# Patient Record
Sex: MALE | Race: WHITE | NOT HISPANIC OR LATINO | Employment: OTHER | URBAN - METROPOLITAN AREA
[De-identification: names, ages, dates, MRNs, and addresses within clinical notes are randomized per-mention and may not be internally consistent; named-entity substitution may affect disease eponyms.]

---

## 2017-02-06 ENCOUNTER — HOSPITAL ENCOUNTER (OUTPATIENT)
Dept: PHYSICAL THERAPY | Facility: CLINIC | Age: 82
Setting detail: THERAPIES SERIES
End: 2017-02-06
Attending: ORTHOPAEDIC SURGERY
Payer: MEDICARE

## 2017-02-06 PROCEDURE — G8979 MOBILITY GOAL STATUS: HCPCS | Mod: GP,CJ | Performed by: PHYSICAL THERAPIST

## 2017-02-06 PROCEDURE — 97112 NEUROMUSCULAR REEDUCATION: CPT | Mod: GP | Performed by: PHYSICAL THERAPIST

## 2017-02-06 PROCEDURE — 97530 THERAPEUTIC ACTIVITIES: CPT | Mod: GP | Performed by: PHYSICAL THERAPIST

## 2017-02-06 PROCEDURE — 97161 PT EVAL LOW COMPLEX 20 MIN: CPT | Mod: GP | Performed by: PHYSICAL THERAPIST

## 2017-02-06 PROCEDURE — G8978 MOBILITY CURRENT STATUS: HCPCS | Mod: GP,CK | Performed by: PHYSICAL THERAPIST

## 2017-02-06 PROCEDURE — 40000719 ZZHC STATISTIC PT DEPARTMENT NEURO VISIT: Performed by: PHYSICAL THERAPIST

## 2017-02-06 NOTE — PROGRESS NOTES
02/06/17 1300   General Information   Start of Care Date 02/06/17   Referring Physician Marifer Carpenter, DO   Orders Evaluate and Treat as Indicated   Additional Orders balance /strengthening   Order Date 01/27/17   Medical Diagnosis dysarthria, ataxia   Onset of illness/injury or Date of Surgery 01/27/17   Special Instructions fell in [place] last trip there   Surgical/Medical history reviewed Yes   Pertinent history of current problem (include personal factors and/or comorbidities that impact the POC) 3 falls in past yr; can get up.  was in [place] and fell after doing bal exer.  the light was out.  little by little i do less.  i have a 4ww.  [place]  my exer is SLS and stand and look side to side.  don't do bed exer.  i do tandem stance.  i walk as much as poss.  in [place] walked 40 min.  i go to a mall here occas and walk.  in summer i walk outside alone w cell phone.  i am going to [facility] 2/14 - 28 to [place].  Denny will go w me.  pt here w friend Albino   Pertinent Visual History  glasses   Prior level of functional mobility Ambulation   Ambulation w walker   Current Community Support Family/friend caregiver   Patient role/Employment history Retired   Living environment [facility]   Home/Community Accessibility Comments has renter upstairs, lifeline, friends check on him, does not drive.  worked as an actor.    Current Assistive Devices Front Wheeled Walker;Four Wheeled Walker   Patient/Family Goals Statement not fall; better balance   General Information Comments pt here w friend support person Albino   Fall Risk Screen   Fall screen completed by PT   Per patient - Fall 2 or more times in past year? Yes   Per patient - Fall with injury in past year? No   Timed Up and Go score (seconds) 28  (22)   Is patient a fall risk? Yes;Department fall risk interventions implemented   Fall screen comments angel score 36   System Outcome Measures   Outcome Measures AM-PAC   AM-PAC  Basic Mobility  Score Level  (Lower scores equate to lower levels of function) 54.95   AM-PAC  Daily Activity Score Level  (Lower scores equate to lower levels of function) 43.78   AM-PAC  Applied Cognitive Score Level  (Lower scores equate to lower levels of function) 39.59   Pain   Patient currently in pain No   Vital Signs   Vital Signs Pulse;BP   Pulse 71   BP (!) 147/61 mmHg   Cognitive Status Examination   Orientation orientation to person, place and time   Level of Consciousness alert   Follows Commands and Answers Questions 100% of the time   Personal Safety and Judgment intact   Memory intact   Integumentary   Integumentary No deficits were identified   Posture   Posture Forward head position;Protracted shoulders   Range of Motion (ROM)   ROM Comment wfls   Strength   Strength Comments wfls   Bed Mobility   Bed Mobility Comments indep   Transfer Skills   Transfer Comments prefers to use hands to rise from chair   Locomotion   Wheel Chair Mobility Comments n/a   Gait Special Tests   Gait Special Tests 25 FOOT TIMED WALK   Gait Special Tests 25 Foot Timed Walk   Seconds 12.6   Steps 17 Steps   Comments w 4ww   Balance Special Tests   Balance Special Tests Single leg stance right;Single leg stance left;Romberg;Sharpened Romberg;Braga balance   Balance Special Tests Braga Balance   Score out of 56 35   Balance Special Tests Single Leg Stance Right,   Right, seconds 5 Seconds   Balance Special Tests Single Leg Stance Left   Left, seconds 5 Seconds   Balance Special Tests Romberg   Seconds 30 Seconds   Balance Special Tests Sharpened Romberg   Seconds 20 Seconds   Comments close sba/cga   Coordination   Coordination Comments not tested   Muscle Tone   Muscle Tone Comments [facility]   Planned Therapy Interventions   Planned Therapy Interventions balance training;gait training;neuromuscular re-education;strengthening   Clinical Impression   Criteria for Skilled Therapeutic Interventions Met yes, treatment indicated   PT Diagnosis  imbalance w falls risk   Influenced by the following impairments imbalance, age   Functional limitations due to impairments fall risk, gait slowed.   Clinical Presentation Stable/Uncomplicated   Clinical Presentation Rationale pt has good support, had 3 falls in past yr, uses AD   Clinical Decision Making (Complexity) Low complexity   Therapy Frequency other (see comments)   Predicted Duration of Therapy Intervention (days/wks) up to 8x in 90 days   Risk & Benefits of therapy have been explained Yes   Patient, Family & other staff in agreement with plan of care Yes   Clinical Impression Comments blair 36 w recent falls, needs skilled PT, 89 yo.    Goal 1   Goal Identifier BLAIR   Goal Description score to improve to 40 or higher for decreased fall risk   Target Date 05/06/17   Goal 2   Goal Identifier 25ft walk   Goal Description pt to perf 25 ft walk w 4ww in 10 sec for safety in busy park lot   Target Date 05/06/17   Goal 3   Goal Identifier falls prev   Goal Description pt to verbalize 3 or + falls prev ideas to decrease risk   Target Date 05/06/17   Goal 4   Goal Identifier HEP   Goal Description pt to be indep w his balance HEP for overall safety w balance.     Target Date 05/06/17   Total Evaluation Time   Total Evaluation Time (Minutes) 30

## 2017-02-06 NOTE — PROGRESS NOTES
[facility]        OUTPATIENT PHYSICAL THERAPY FUNCTIONAL EVALUATION  PLAN OF TREATMENT FOR OUTPATIENT REHABILITATION  (COMPLETE FOR INITIAL CLAIMS ONLY)  Patient's Last Name, First Name, M.I.  YOB: 1926  Raj Ruiz     Provider's Name   [facility]   Medical Record No.  [id number]     Start of Care Date:  02/06/17   Onset Date:  01/27/17   Type:     _X__PT   ____OT  ____SLP Medical Diagnosis:   ataxia     PT Diagnosis:  imbalance w falls risk Visits from SOC:  1                              __________________________________________________________________________________  Plan of Treatment/Functional Goals:  balance training, gait training, neuromuscular re-education, strengthening           GOALS  BLAIR  score to improve to 40 or higher for decreased fall risk  05/06/17    25ft walk  pt to perf 25 ft walk w 4ww in 10 sec for safety in [employer] park lot  05/06/17    falls prev  pt to verbalize 3 or + falls prev ideas to decrease risk  05/06/17    HEP   pt to be indep w his balance HEP for overall safety w balance.    05/06/17        Therapy Frequency:  other (see comments)   Predicted Duration of Therapy Intervention:  up to 8x in 90 days    Karolina Cortes, PT                                    I CERTIFY THE NEED FOR THESE SERVICES FURNISHED UNDER        THIS PLAN OF TREATMENT AND WHILE UNDER MY CARE .           Physician Signature               Date    X_____________________________________________________        Certification Date From:    2/6/17  Certification Date To:  5/6/17     Referring Provider:  Marifer Carpenter, DO    Initial Assessment  See Epic Evaluation- Start of Care Date: 02/06/17

## 2017-02-06 NOTE — IP AVS SNAPSHOT
MRN:[id number]                      After Visit Summary   2/6/2017    Raj Ruiz    MRN: [id number]           Visit Information        Provider Department      2/6/2017  1:00 PM Karolina Cortes, PT [facility], [place], Physical Therapy - Outpatient          Further instructions from your care team       2/6/17    Fall prevention  Get auto turn on nightlights for your trip    Use a bag to carry items on your arm so hands free on the steps.      Home program - stop the one leg stance - we will look at this on Thursday.    Karolina Cortes PT  [email or web address]  [phone]  Pager [phone]  :  [phone]    MyChart Information     [employer] gives you secure access to your electronic health record. If you see a primary care provider, you can also send messages to your care team and make appointments. If you have questions, please call your primary care clinic.  If you do not have a primary care provider, please call [phone] and they will assist you.        Care EveryWhere ID     This is your Care EveryWhere ID. This could be used by other organizations to access your [place] medical records  [id number]

## 2017-02-06 NOTE — DISCHARGE INSTRUCTIONS
2/6/17    Fall prevention  Get auto turn on nightlights for your trip    Use a bag to carry items on your arm so hands free on the steps.      Home program - stop the one leg stance - we will look at this on Thursday.    [email or web address]  [phone]  Pager [phone]  :  [phone]

## 2017-02-09 ENCOUNTER — HOSPITAL ENCOUNTER (OUTPATIENT)
Dept: PHYSICAL THERAPY | Facility: CLINIC | Age: 82
Setting detail: THERAPIES SERIES
End: 2017-02-09
Attending: ORTHOPAEDIC SURGERY
Payer: MEDICARE

## 2017-02-09 PROCEDURE — 40000719 ZZHC STATISTIC PT DEPARTMENT NEURO VISIT: Performed by: PHYSICAL THERAPIST

## 2017-02-09 PROCEDURE — 97112 NEUROMUSCULAR REEDUCATION: CPT | Mod: GP | Performed by: PHYSICAL THERAPIST

## 2017-02-09 NOTE — IP AVS SNAPSHOT
"                  MRN:[id number]                      After Visit Summary   2/9/2017    Raj Ruiz    MRN: [id number]           Visit Information        Provider Department      2/9/2017  2:45 PM Karolina Cortes, PT [facility], [place], Physical Therapy - Outpatient        Your next 10 appointments already scheduled     Mar 02, 2017  1:00 PM   Treatment 45 with Karolina Cortes, PT   [facility], [place], Physical Therapy - Outpatient ([facility])    [address]  [place] [address]   [phone]                Further instructions from your care team       2/9/17    Current exercises for balance are your \"A\" exercises.  Aim for 2 days a week.    Then the \"B\" exercises are as follows:  (Only do these by themselves - not on the same day as the A exercises)  Have THREE sides of support (2 counters and one walker)    1.  Diagonal weight shifts.   Feet staggered and apart.  Shift your weight.  Forward/back 10 to 20 times.  Have hands ready to touch the counter.  When it feels good then arms can swing.  Then switch feet.    2.  Quarter \"90 degree\" turns - start facing the walker - then reach back and touch the counter and turn Left.  Then turn and face the walker.  Then turn to the Right.  Then back to center.   Can to 4-5 repetitions total.   Goal is to  your feet while turning but touch with hand for safety.      Have a good time in [place].   Karolina  PT      MyChart Information     [employer] gives you secure access to your electronic health record. If you see a primary care provider, you can also send messages to your care team and make appointments. If you have questions, please call your primary care clinic.  If you do not have a primary care provider, please call [phone] and they will assist you.        Care EveryWhere ID     This is your Care EveryWhere ID. This could be used by other organizations to access your [facility]" records  [id number]

## 2017-02-09 NOTE — DISCHARGE INSTRUCTIONS
"2/9/17    Current exercises for balance are your \"A\" exercises.  Aim for 2 days a week.    Then the \"B\" exercises are as follows:  (Only do these by themselves - not on the same day as the A exercises)  Have THREE sides of support (2 counters and one walker)    1.  Diagonal weight shifts.   Feet staggered and apart.  Shift your weight.  Forward/back 10 to 20 times.  Have hands ready to touch the counter.  When it feels good then arms can swing.  Then switch feet.    2.  Quarter \"90 degree\" turns - start facing the walker - then reach back and touch the counter and turn Left.  Then turn and face the walker.  Then turn to the Right.  Then back to center.   Can to 4-5 repetitions total.   Goal is to  your feet while turning but touch with hand for safety.      Have a good time in [place].   Karolina  PT    "

## 2017-03-02 ENCOUNTER — HOSPITAL ENCOUNTER (OUTPATIENT)
Dept: PHYSICAL THERAPY | Facility: CLINIC | Age: 82
Setting detail: THERAPIES SERIES
End: 2017-03-02
Attending: ORTHOPAEDIC SURGERY
Payer: MEDICARE

## 2017-03-02 PROCEDURE — 97112 NEUROMUSCULAR REEDUCATION: CPT | Mod: GP | Performed by: PHYSICAL THERAPIST

## 2017-03-02 PROCEDURE — 40000719 ZZHC STATISTIC PT DEPARTMENT NEURO VISIT: Performed by: PHYSICAL THERAPIST

## 2017-03-02 PROCEDURE — 97116 GAIT TRAINING THERAPY: CPT | Mod: GP | Performed by: PHYSICAL THERAPIST

## 2017-03-20 ENCOUNTER — HOSPITAL ENCOUNTER (OUTPATIENT)
Dept: PHYSICAL THERAPY | Facility: CLINIC | Age: 82
Setting detail: THERAPIES SERIES
End: 2017-03-20
Attending: ORTHOPAEDIC SURGERY
Payer: MEDICARE

## 2017-03-20 PROCEDURE — 40000719 ZZHC STATISTIC PT DEPARTMENT NEURO VISIT: Performed by: PHYSICAL THERAPIST

## 2017-03-20 PROCEDURE — 97530 THERAPEUTIC ACTIVITIES: CPT | Mod: GP | Performed by: PHYSICAL THERAPIST

## 2017-03-20 PROCEDURE — 97116 GAIT TRAINING THERAPY: CPT | Mod: GP,59 | Performed by: PHYSICAL THERAPIST

## 2017-03-27 ENCOUNTER — HOSPITAL ENCOUNTER (OUTPATIENT)
Dept: OCCUPATIONAL THERAPY | Facility: CLINIC | Age: 82
Setting detail: THERAPIES SERIES
End: 2017-03-27
Attending: PSYCHIATRY & NEUROLOGY
Payer: MEDICARE

## 2017-03-27 PROCEDURE — 97535 SELF CARE MNGMENT TRAINING: CPT | Mod: GO,59 | Performed by: OCCUPATIONAL THERAPIST

## 2017-03-27 PROCEDURE — 40000249 ZZH STATISTIC VISIT LOW VISION CLINIC: Performed by: OCCUPATIONAL THERAPIST

## 2017-03-27 PROCEDURE — 97530 THERAPEUTIC ACTIVITIES: CPT | Mod: GO | Performed by: OCCUPATIONAL THERAPIST

## 2017-03-27 PROCEDURE — G8988 SELF CARE GOAL STATUS: HCPCS | Mod: GO,CI | Performed by: OCCUPATIONAL THERAPIST

## 2017-03-27 PROCEDURE — G8987 SELF CARE CURRENT STATUS: HCPCS | Mod: GO,CJ | Performed by: OCCUPATIONAL THERAPIST

## 2017-03-27 PROCEDURE — 97166 OT EVAL MOD COMPLEX 45 MIN: CPT | Mod: GO | Performed by: OCCUPATIONAL THERAPIST

## 2017-03-27 ASSESSMENT — VISUAL ACUITY
OD: NOT AVAILABLE
OS: NOT AVAILABLE

## 2017-03-27 ASSESSMENT — ACTIVITIES OF DAILY LIVING (ADL): PRIOR_ADL/IADL_STATUS: INDEPENDENT PRIOR TO ONSET

## 2017-03-27 NOTE — PROGRESS NOTES
" 03/27/17 1300   Visit Type   Type of Visit Initial       Present No   General Information   Start Of Care Date 03/27/17   Referring Physician Marifer Carpenter MD   Orders Evaluate And Treat As Indicated   Date of Order 03/06/17   Medical Diagnosis Ataxia, diplopia, central scotomas both eyes   Onset Of Illness/injury Or Date Of Surgery 03/06/2017   Surgical/Medical history reviewed (cataract s/p, \"stroke\"  right eye, retinal change OS)   Precautions/Limitations balance impaired, ataxia - 5 years, hip fx    Additional Occupational Profile Info/Pertinent History of Current Problem pt seeing PT for balance and strengthening.    Prior ADL/IADL status Independent prior to onset   Others present at visit (Friend and support person: Albino)   Patient/family Goals Statement reading, acurate reach, depth perception: pouring, judging depth - safety in mobility   Social History/Home Environment   [facility]  (lives alone in own home)   Current Community Support (cleaning assist, grocery assist, laundry assist)   Patient Role/employment History  Retired   Avocational theater, music, reader, painting   Pain Assessment   Pain Reported No   Fall Risk Screen   Fall screen completed by OT   Have you fallen 2 or more times in the last year? Yes   Have you fallen and had an injury in the past year? No   Is the patient a fall risk? Yes   Comments PT in process   Cognitive/Behavioral   Communication Intact   Cognitive Status Intact for evaluation process   Behavior Appropriate   Patient/family aware of diagnosis Yes   How well do you understand your eye condition? Well   Reported emotional impact of visual impairment Moderate   Adjustment to disability Good   Physical Status/Equipment   Physical Status Impaired balance   Mobility equipment used Walker   Visual Report   Functional Complaints Reading;Writing;Homemaking;Safety in mobility   Visual Complaints Scotoma Hindrance right eye;Scotoma " Hindrance left eye;Double vision   Magnifier (strength and type) magnifier   Reading glasses (OTC readers -=1.50-2.0)   Technology (cell phone)   Lighting and Glare   Is your lighting adequate? Yes/ at home   Is glare a problem? Yes/ outdoors   Are you satisfied with your sunglasses? Yes   Visual Acuity   Acuity right eye not available   Acuity left eye not available   Contrast Sensitivity   Contrast sensitivity (score/25) 25/25   [place] Read   Smallest print size read 1.0 M with left eye closed. 1.6M both eyes   Critical print size 1.0M single sentence reading left eye closed   Words per minute at critical print size 120 single sentence, left eye closed   Dynavision: Evaluation of visual skills/search of extra personal space via 5X4 foot computerized light board with 64 stimuli.  The user reacts as quickly as possible by striking the lights as they turn on in random succession.   Dynavision Cascade Dynavision Mode A (single stimulus attention, 1 minute   Dynavision Mode A (single stimulus attention, 1 minute)   Patient Score (Mode A, 1 min) 34   Dynavision Mode A (SSA, 1 Min) Comment lower quadrants: seated   SRAFVP SCORING   # relevant measures 34   Composite score 44   Percentage of disability (%) 35.29   Education   Learner Patient;Other  (friend, Peter)   Readiness Eager;Acceptance   Method Explanation;Demonstration   Response Verbalizes understanding;Demonstrates understanding;Needs reinforcement   Clinical Impression, OT Eval   Criteria for Skilled Therapeutic Interventions Met yes;treatment indicated   Therapy  Diagnosis: Impaired ADL/IADL with deficits in Reading based ADL;Written communication;Home management;Financial management;Eating  (safety in mobility)   Impairment comments ataxia contributes to deficts that involve fine motor and gross motor control   Assessment of Occupational Performance 5 or more Performance Deficits   Identified Performance Deficits as above   Clinical Decision Making (Complexity)  Moderate complexity   Clinical Impression Comments treatment will integrate both motor and visual compensatory strategies   OT Visual Rehabilitation Evaluation Plan   Therapy Plan Occupational therapy intervention   Planned Interventions Scotoma awareness;Visual skills training for near tasks;Low vision compensatory training for reading;Low vision compensatory training for written communication;Low vision compensatory trainging for financial management;Instruction in environmental adaptations for lighting;Optical device/ADL device instruction and training;Instruction in community resources   Frequency / Duration 4 tx visits over 12 weeks - 1X every 3 weeks for 12 weeks   Risks and Benefits of Treatment have been explained. Yes   Patient, Family in agreement with plan of care Yes   GOALS   Goals Near Vision;Environmental Modification;Resource Education   Goals Addressed this Session Near vision   Goal 1 - Near Vision   Goal Description: Near Vision Patient will verbalize awareness of visual field Loss and demonstrate improved use of visual skills/adaptive equipment for increased independence in reading-based activities of daily living, written communication and detail ADL tasks.   Target Date 06/19/17   Goal 3 - Environmental modification   Goal Description: Environment modification Patient will demonstrate understanding of the impact of lighting, contrast and glare on ADL activities, in conjunction with environmentally-based ADL modifications   Target Date 06/19/17   Goal 4 - Resource education   Goal Description: Resource education Patient will verbalize awareness of community resources for the following:;Audio access to print materials;Access to large print materials;Access to low vision devices   Target Date 06/19/17   Total Evaluation Time   Total Evaluation Time 45   Therapy Certification   Certification date from 03/27/17   Certification date to 06/19/17   Medical Diagnosis central scotomas, ataxia

## 2017-03-27 NOTE — PROGRESS NOTES
[facility]          OUTPATIENT OCCUPATIONALTHERAPY  EVALUATION  PLAN OF TREATMENT FOR OUTPATIENT REHABILITATION  (COMPLETE FOR INITIAL CLAIMS ONLY)  Patient's Last Name, First Name, M.I.  YOB: 1926  Raj Ruiz      Provider's Name  [facility] Medical Record No.  [id number]   Onset Date:  03/06/2017 Start of Care Date:  03/27/17   Type:     ___PT  _X_OT   ___SLP Medical Diagnosis:  central scotomas, ataxia   Therapy Diagnosis: Impaired ADL/IADL with deficits in Reading based ADL, Written communication, Home management, Financial management, Eating (safety in mobility)  ataxia contributes to deficts that involve fine motor and gross motor control Visits from SOC: 1     _________________________________________________________________________________  Plan of Treatment/Functional Goals:  Planned Interventions: Scotoma awareness, Visual skills training for near tasks, Low vision compensatory training for reading, Low vision compensatory training for written communication, Low vision compensatory trainging for financial management, Instruction in environmental adaptations for lighting, Optical device/ADL device instruction and training, Instruction in community resources        Goals  1. Patient will verbalize awareness of visual field Loss and demonstrate improved use of visual skills/adaptive equipment for increased independence in reading-based activities of daily living, written communication and detail ADL tasks.         Target Date: 06/19/17      2.                  3.  Patient will demonstrate understanding of the impact of lighting, contrast and glare on ADL activities, in conjunction with environmentally-based ADL modifications         Target Date: 06/19/17      4. Patient will verbalize awareness of community resources for the following:, Audio access to  print materials, Access to large print materials, Access to low vision devices         Target Date: 06/19/17      5.                   6.                    7.                 8.                            Therapy Frequency/Duration:  4 tx visits over 12 weeks - 1X every 3 weeks for 12 weeks    Luna Whitmore, OT       I CERTIFY THE NEED FOR THESE SERVICES FURNISHED UNDER        THIS PLAN OF TREATMENT AND WHILE UNDER MY CARE     (Physician co-signature of this document indicates review and certification of the therapy plan).                  Certification date from: 03/27/17 Certification date to: 06/19/17          Referring Physician: Marifer Carpenter MD     Initial Assessment        See Epic Evaluation Start Of Care Date: 03/27/17     Luna Whitmore

## 2017-04-17 ENCOUNTER — HOSPITAL ENCOUNTER (OUTPATIENT)
Dept: PHYSICAL THERAPY | Facility: CLINIC | Age: 82
Setting detail: THERAPIES SERIES
End: 2017-04-17
Attending: ORTHOPAEDIC SURGERY
Payer: MEDICARE

## 2017-04-17 PROCEDURE — 97112 NEUROMUSCULAR REEDUCATION: CPT | Mod: GP | Performed by: PHYSICAL THERAPIST

## 2017-04-17 PROCEDURE — 40000719 ZZHC STATISTIC PT DEPARTMENT NEURO VISIT: Performed by: PHYSICAL THERAPIST

## 2017-04-20 ENCOUNTER — HOSPITAL ENCOUNTER (OUTPATIENT)
Dept: OCCUPATIONAL THERAPY | Facility: CLINIC | Age: 82
Discharge: HOME | End: 2017-04-20
Attending: OCCUPATIONAL THERAPIST | Admitting: OCCUPATIONAL THERAPIST
Payer: MEDICARE

## 2017-04-20 PROCEDURE — 40000249 ZZH STATISTIC VISIT LOW VISION CLINIC: Performed by: OCCUPATIONAL THERAPIST

## 2017-04-20 PROCEDURE — 97535 SELF CARE MNGMENT TRAINING: CPT | Mod: GO | Performed by: OCCUPATIONAL THERAPIST

## 2017-05-04 ENCOUNTER — HOSPITAL ENCOUNTER (OUTPATIENT)
Dept: PHYSICAL THERAPY | Facility: CLINIC | Age: 82
Setting detail: THERAPIES SERIES
End: 2017-05-04
Attending: ORTHOPAEDIC SURGERY
Payer: MEDICARE

## 2017-05-04 PROCEDURE — 40000719 ZZHC STATISTIC PT DEPARTMENT NEURO VISIT: Performed by: PHYSICAL THERAPIST

## 2017-05-04 PROCEDURE — 97110 THERAPEUTIC EXERCISES: CPT | Mod: GP | Performed by: PHYSICAL THERAPIST

## 2017-05-04 PROCEDURE — G8979 MOBILITY GOAL STATUS: HCPCS | Mod: GP,CJ | Performed by: PHYSICAL THERAPIST

## 2017-05-04 PROCEDURE — 97530 THERAPEUTIC ACTIVITIES: CPT | Mod: GP | Performed by: PHYSICAL THERAPIST

## 2017-05-04 PROCEDURE — G8980 MOBILITY D/C STATUS: HCPCS | Mod: GP,CI | Performed by: PHYSICAL THERAPIST

## 2017-06-25 NOTE — ADDENDUM NOTE
Encounter addended by: Karolina Cortes, PT on: 6/25/2017  1:05 PM<BR>     Actions taken: Flowsheet data copied forward, Flowsheet accepted, Charge Capture section accepted, Episode resolved

## 2019-05-24 ENCOUNTER — OFFICE VISIT (OUTPATIENT)
Dept: AUDIOLOGY | Facility: CLINIC | Age: 84
End: 2019-05-24
Payer: MEDICARE

## 2019-05-24 DIAGNOSIS — H90.3 SENSORY HEARING LOSS, BILATERAL: Primary | ICD-10-CM

## 2019-07-12 ENCOUNTER — OFFICE VISIT (OUTPATIENT)
Dept: FAMILY MEDICINE | Facility: CLINIC | Age: 84
End: 2019-07-12
Payer: MEDICARE

## 2019-07-12 ENCOUNTER — OFFICE VISIT (OUTPATIENT)
Dept: AUDIOLOGY | Facility: CLINIC | Age: 84
End: 2019-07-12
Payer: MEDICARE

## 2019-07-12 VITALS — DIASTOLIC BLOOD PRESSURE: 68 MMHG | SYSTOLIC BLOOD PRESSURE: 112 MMHG | TEMPERATURE: 98.2 F | HEART RATE: 67 BPM

## 2019-07-12 DIAGNOSIS — H61.23 CERUMINOSIS, BILATERAL: Primary | ICD-10-CM

## 2019-07-12 DIAGNOSIS — H90.3 SENSORINEURAL HEARING LOSS, BILATERAL: Primary | ICD-10-CM

## 2019-07-12 PROCEDURE — 99213 OFFICE O/P EST LOW 20 MIN: CPT | Mod: 25 | Performed by: NURSE PRACTITIONER

## 2019-07-12 PROCEDURE — 69209 REMOVE IMPACTED EAR WAX UNI: CPT | Mod: 50 | Performed by: NURSE PRACTITIONER

## 2019-07-12 NOTE — PROGRESS NOTES
AUDIOLOGY REPORT    BACKGROUND INFORMATION: Raj Ruiz was seen in the Audiology Clinic at the [facility] on 7/12/2019 for follow-up.  The patient has been seen previously in this clinic on 11/11/2016 and results revealed a bilateral sensorineural hearing loss.  The patient was fit with binaural Phonak Virto Q90-13 at an outside clinic in 2014.  The patient reports feedback with both hearing aids.  The hearing aids were cleaned and assessed by the audiology assistant, Tim Mills, on 7/8/19 and were found to be functioning appropriately.      TEST RESULTS AND PROCEDURES: Otoscopy revealed significant bilateral cerumen. The patient stated that he was on blood thinning medication and so it was decided not to clean the ears today but have him follow-up with ENT or PCP to have the cerumen removed.      It was discussed that the cerumen in the ears is likely causing the feedback. Because the patient did not get the hearing aids from us, any programming would be an out of pocket expense for him. It was decided to not program the hearing aids today due to the cerumen. If the feedback persists following the cerumen the removal than Sg will return for programming.    SUMMARY AND RECOMMENDATIONS: A hearing aid follow-up was performed today and the patient reports feedback from both devices. Otoscopy revealed bilateral cerumen which is the likely cause of the feedback. The patient will follow-up with ENT or his PCP to have the cerumen removed and if the feedback persists, he is welcome to return for programming. The patient was in agreement with the plan. Today's appointment is a No Charge visit as no billable service was provided. Call this clinic with questions regarding today s visit.      Olive Thomas  Audiologist  [place] License  #[id number]

## 2019-07-12 NOTE — PROGRESS NOTES
Subjective     Raj Ruiz is a 89 year old male who presents to clinic today for the following health issues:    HPI   Ear wax       Duration: every year     Description (location/character/radiation): Gets it clean out once a year and did not get it done this year. On blood thinners and was going to check hearing aids at appointment but they could not do it because of the wax and concern about causing bleeding.. Too much wax and also due to blood thinners.     Intensity:  moderate    Accompanying signs and symptoms: hearing aids making buzzing    History (similar episodes/previous evaluation): None    Precipitating or alleviating factors: None    Therapies tried and outcome: wax removal.        Reviewed and updated as needed this visit by Provider         Review of Systems   ROS COMP:   ROS:5 point ROS including CONST, HEENT, Respiratory, CV, and GI other than that noted in the HPI,  is negative         Objective    /68   Pulse 67   Temp 98.2  F (36.8  C) (Oral)   There is no height or weight on file to calculate BMI.  Physical Exam   GENERAL: healthy, alert and no distress  EYES: Eyes grossly normal to inspection, PERRL and conjunctivae and sclerae normal  HENT: normal cephalic/atraumatic, both ears: occluded with wax, nose and mouth without ulcers or lesions, oropharynx clear and oral mucous membranes moist  NECK: no adenopathy, no asymmetry, masses, or scars and thyroid normal to palpation  RESP: lungs clear to auscultation - no rales, rhonchi or wheezes  CV: regular rate and rhythm, normal S1 S2, no S3 or S4, no murmur, click or rub, no peripheral edema and peripheral pulses strong    Cerumen removed via ear wash by MA.  TM appeared normal following ear wash and hearing was improved with less interference of hearing aids    Diagnostic Test Results:  none         Assessment & Plan     (H61.23) Ceruminosis, bilateral  (primary encounter diagnosis)  Comment:   Plan: REMOVE IMPACTED CERUMEN                  CONSULTATION/REFERRAL to audiology    Return in about 1 month (around 8/12/2019) for Routine Visit with PCP.    ANAI [facility]

## 2019-07-12 NOTE — NURSING NOTE
Raj Ruiz is a 89 year old male who presents in clinic with complaint of impacted ear wax (cerumen).  Per the order of Lois Meehan, ear wax was removed from both sides by flushing with warm water and 3% solution and manual debridement has not been performed. Patient denies pain/dizziness/discharge/drainage  (if yes, stop procedure and huddle with provider).  Ear wax has been successfully removed. (If not, huddle with provider).   Darin Madrid

## 2019-07-31 ENCOUNTER — TRANSFERRED RECORDS (OUTPATIENT)
Dept: HEALTH INFORMATION MANAGEMENT | Facility: CLINIC | Age: 84
End: 2019-07-31

## 2019-08-12 ENCOUNTER — TRANSFERRED RECORDS (OUTPATIENT)
Dept: HEALTH INFORMATION MANAGEMENT | Facility: CLINIC | Age: 84
End: 2019-08-12

## 2019-08-20 ENCOUNTER — TRANSFERRED RECORDS (OUTPATIENT)
Dept: HEALTH INFORMATION MANAGEMENT | Facility: CLINIC | Age: 84
End: 2019-08-20

## 2019-09-28 NOTE — TELEPHONE ENCOUNTER
RECORDS RECEIVED FROM: Self   DATE RECEIVED: 10/25/19   NOTES (FOR ALL VISITS) STATUS DETAILS   OFFICE NOTE from referring provider N/A    OFFICE NOTE from other specialist Received [facility]:  8/20/19 7/31/19   DISCHARGE SUMMARY from hospital N/A    DISCHARGE REPORT from the ER N/A    OPERATIVE REPORT N/A    MEDICATION LIST Internal    IMAGING  (FOR ALL VISITS)     EMG N/A    EEG N/A    ECT N/A    MRI (HEAD, NECK, SPINE) N/A    LUMBAR PUNCTURE N/A    CLARE Scan N/A    CT (HEAD, NECK, SPINE) Received [facility]:  CT Head 8/12/19  CT Cervical Spine 8/12/19  CTA Head Neck 8/20/19     Action    Action Taken Records request faxed to [email or web address]

## 2019-10-01 NOTE — TELEPHONE ENCOUNTER
Action    Action Taken Records received from [facility] via fax. Sent to scanning     Imaging Received     Image Type (x): Disc:   PACS: x   Exam Date/Name CT Head 8/12/19  CT Cervical Spine 8/12/19  CTA Head Neck 8/20/19       Comments: Images resolved in PACS

## 2019-10-04 ENCOUNTER — HEALTH MAINTENANCE LETTER (OUTPATIENT)
Age: 84
End: 2019-10-04

## 2019-10-25 ENCOUNTER — PRE VISIT (OUTPATIENT)
Dept: NEUROLOGY | Facility: CLINIC | Age: 84
End: 2019-10-25

## 2019-10-25 ENCOUNTER — OFFICE VISIT (OUTPATIENT)
Dept: NEUROLOGY | Facility: CLINIC | Age: 84
End: 2019-10-25
Payer: MEDICARE

## 2019-10-25 VITALS
OXYGEN SATURATION: 98 % | SYSTOLIC BLOOD PRESSURE: 154 MMHG | RESPIRATION RATE: 16 BRPM | DIASTOLIC BLOOD PRESSURE: 75 MMHG | HEART RATE: 75 BPM | WEIGHT: 151.5 LBS | BODY MASS INDEX: 26 KG/M2

## 2019-10-25 DIAGNOSIS — R27.0 ATAXIA: Primary | ICD-10-CM
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
## 2019-10-25 PROBLEM — C61 MALIGNANT NEOPLASM OF PROSTATE (H): Status: ACTIVE | Noted: 2019-10-25

## 2019-10-25 PROBLEM — S22.32XA LEFT RIB FRACTURE: Status: ACTIVE | Noted: 2019-09-08

## 2019-10-25 PROBLEM — I45.9 CARDIAC CONDUCTION DISORDER: Status: ACTIVE | Noted: 2019-10-25

## 2019-10-25 PROBLEM — M50.20 DISPLACEMENT OF CERVICAL INTERVERTEBRAL DISC WITHOUT MYELOPATHY: Status: ACTIVE | Noted: 2019-10-25

## 2019-10-25 PROBLEM — H54.50 LOW VISION, ONE EYE: Status: ACTIVE | Noted: 2019-10-25

## 2019-10-25 PROBLEM — H91.90 HEARING LOSS: Status: ACTIVE | Noted: 2019-10-25

## 2019-10-25 ASSESSMENT — ENCOUNTER SYMPTOMS
SINUS CONGESTION: 0
SINUS PAIN: 0
SYNCOPE: 0
DIZZINESS: 1
DISTURBANCES IN COORDINATION: 1
SPEECH CHANGE: 1

## 2019-10-25 ASSESSMENT — PAIN SCALES - GENERAL: PAINLEVEL: NO PAIN (0)

## 2019-10-25 NOTE — PROGRESS NOTES
I have personally interviewed and examined Mr. Raj Ruiz and agree with diagnosis and management. The total time spent with the patient was 60 minutes, over 50% of the time spent in counseling and coordinating care.    Answers for HPI/ROS submitted by the patient on 10/25/2019   General Symptoms: No  Skin Symptoms: No  HENT Symptoms: Yes  EYE SYMPTOMS: No  HEART SYMPTOMS: Yes  LUNG SYMPTOMS: No  INTESTINAL SYMPTOMS: No  URINARY SYMPTOMS: Yes  REPRODUCTIVE SYMPTOMS: No  SKELETAL SYMPTOMS: Yes  BLOOD SYMPTOMS: No  NERVOUS SYSTEM SYMPTOMS: Yes  MENTAL HEALTH SYMPTOMS: Yes  Ear pain: No  Ear discharge: No  Hearing loss: Yes  Tinnitus: No  Nosebleeds: No  Congestion: No  Sinus pain: No  Hearing aid used: Yes  Fainting: No  Pacemaker: Yes  Trouble with coordination: Yes  Dizziness or trouble with balance: Yes  Speech problems: Yes

## 2019-10-25 NOTE — NURSING NOTE
Pt is here for an ataxia evaluation. He said he has problems with balance. He said he has fallen in the past month. Sometimes he can get himself up off the floor. He said he recently cracked a rib when he fell. He first fell 8 years ago and fractured a femor. He denies headaches. He has had cataract surgery and sometimes he has double vision. His last eye exam was in 2019. He said he chokes sometimes on liquids. He has never had a swallow study. He usually goes to [facility]. He just finished physical therapy yesterday. He said he occasionally has problems with bowel and bladder habits. He said he thinks he falls because of balance problems. He said he sleeps ok at night, he sleeps between 7 and 9 hours per night. He said he gets sleepy in the middle of the afternoon and takes a nap. He usually goes to sleep at around 1 am and then sleeps until 10:00 or 11:00. He started using a cane 8 or 9 years ago.  He said he uses a walker all the time, he has since he fell 8 years ago. He still falls sometimes when he uses his walker.

## 2019-10-25 NOTE — PROGRESS NOTES
"Department of Neurology  Movement Disorders Division     Patient: Raj Ruiz   MRN: [id number]   : 1926   Date of Visit: 10/25/2019     Dear Colleague,     Thank you for referring your patient, Mr. Ruiz, to the [facility]. Please see a copy of my visit note below.    Reason for visit: Evaluate for ataxia  Referring Physician: Self-referral    History of Present Illness  Mr. Ruiz is a 89 year old male that presents to Neurology Ataxia clinic as a new patient for evaluation of ataxia.    History obtained from patient and accompanied by 2 friends. 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  He experienced rapid deterioration with coordination of his limbs, mostly lower extremities, about 6 months ago.  He is experienced 2 falls.  One in which he fractured his left hip and another fall which resulted in fracture of his ribs.  He has no family history of ataxia or coordination issues.  He has no children.  Tries to walk about 25 minutes a day and usually has somebody walking with him for safety.  He reports that his balance is worse towards the end of the day or when he is fatigued.  He walks with a walker.  Also notes dysarthria with difficulty communicating, at times.  He regularly participates in speech activities and reports that he bought a \"tongue twister book\" to practice his speech.    He has a history of atrial flutter for which she takes Coumadin.      Review of Systems:  Other than that mentioned above, the remainder of 12 systems reviewed were negative.    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     Past Surgical History: "   Procedure Laterality Date     APPENDECTOMY       BIOPSY       COLONOSCOPY       HERNIA REPAIR       IMPLANT PACEMAKER       PROSTATE SURGERY       Family History   Problem Relation Age of Onset     Cerebrovascular Disease Mother      Heart Disease Father      Social History     Socioeconomic History     Marital status: Single     Spouse name: Not on file     Number of children: Not on file     Years of education: Not on file     Highest education level: Not on file   Occupational History     Not on file   Social Needs     Financial resource strain: Not on file     Food insecurity:     Worry: Not on file     Inability: Not on file     Transportation needs:     Medical: Not on file     Non-medical: Not on file   Tobacco Use     Smoking status: Former Smoker     Last attempt to quit: 1960     Years since quittin.8     Smokeless tobacco: Never Used   Substance and Sexual Activity     Alcohol use: Yes     Comment: occ     Drug use: No     Sexual activity: Not Currently   Lifestyle     Physical activity:     Days per week: Not on file     Minutes per session: Not on file     Stress: Not on file   Relationships     Social connections:     Talks on phone: Not on file     Gets together: Not on file     Attends Synagogue service: Not on file     Active member of club or organization: Not on file     Attends meetings of clubs or organizations: Not on file     Relationship status: Not on file     Intimate partner violence:     Fear of current or ex partner: Not on file     Emotionally abused: Not on file     Physically abused: Not on file     Forced sexual activity: Not on file   Other Topics Concern     Parent/sibling w/ CABG, MI or angioplasty before 65F 55M? 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     Medications:  Current Outpatient Medications   Medication Sig Dispense Refill     atorvastatin (LIPITOR) 10 MG tablet Take 10 mg by  mouth every other day.       Cholecalciferol (VITAMIN D) 1000 UNITS capsule Take 1 capsule by mouth daily       latanoprost (XALATAN) 0.005 % ophthalmic solution        LUTEIN PO Take  by mouth.       Multiple Vitamins-Minerals (OCUVITE PRESERVISION PO) Take  by mouth.       omeprazole (PRILOSEC) 20 MG capsule        psyllium (METAMUCIL) WAFR Take 2 Wafers by mouth daily       warfarin (COUMADIN) 2.5 MG tablet Take 2.5 mg by mouth daily DAILY FOR NOW       Omega-3 Fatty Acids (FISH OIL PO)        warfarin (COUMADIN) 5 MG tablet Take by mouth daily Taken on Tuesdays and Saturdays           Allergies   Allergen Reactions     Penicillins         Physical Exam:  The patient's  weight is 68.7 kg (151 lb 8 oz). His blood pressure is 154/75 (abnormal) and his pulse is 75. His respiration is 16 and oxygen saturation is 98%.      Physical Exam:  GENERAL: alert, active, attentive, appropriately groomed   HEENT: normocephalic, eyes open with no discharge, nares patent, oropharynx clear-no lesions  CHEST: normal configuration, chest rise equal b/l, non labored breathing   EXTREMITIES: no edema/cyanosis in BUE/BLE, distal pulses intact  PSYCH: mood stable, jovial     Neurologic Exam:  MENTAL STATUS: Alert and oriented to person, place, time, and situation. Follows commands. Recent and remote memory intact. Attention span and concentration intact. Fund of knowledge intact to current events.  SPEECH: Fluent, intact comprehension and moderate dysarthria  CN: visual fields intact in all fields, EOMIB,  Horizontal nystagmus on right gaze, down beat nystagmus with left gaze, normal saccades, PERRL, facial movement symmetric, hearing grossly intact to conversation with hearing aids b/l, tongue protrudes midline   MOTOR: Moves all extremities equally against gravity without difficulty  REFLEXES (R/L): 3/3  in biceps, brachioradialis, triceps, patellars, 0/0 achilles; no clonus  SENSATION: intact to light touch throughout, no neuropathy  or distal shading  COORDINATION: Unable to stand tandem  GAIT: decreased arm swing and length of gait, no en bloc turns, severe ataxic gait, no tandem, wide based gait, ambulates with a walker, slow cautious gait     Data Reviewed:   -CT head reviewed and reveals moderate cerebellar atrophy and age-related diffuse atrophy  -CT a of head and neck reviewed that shows patent anterior and posterior circulation.  -CT cervical spine reported to have normal alignment and no fractures, cervical spondylosis, moderate bilateral neural foraminal narrowing with impingement of the C4 nerve root at C3-4, mild narrowing of spinal canal with mild to moderate narrowing of the bilateral neural foramina at C4-5, moderate narrowing of the right neuroforamina with impingement of the right C6 nerve root at C5-6.  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  Patient now relies on a roller walker for ambulation and reports multiple falls.  CT head reveals moderate cerebellar atrophy.  Differential remains broad at this point and includes a genetic etiology, cerebellar stroke in setting of atrial flutter.  Genetic testing was discussed, however, it was decided that despite the results medical management would not be affected thus patient opted from pursuing this option. At this time, symptomatic treatment is best and was discussed with patient and described below.     1. Ataxia    Plan:   - Avoid falling, keep active safely, recommend walking with a partner at all times  - Consider using a wheelchair to avoid falls  - Perform speech exercises daily    RTC: as needed.    Linnette Hirsch DO  Movement Disorders Fellow      Patient seen and discussed with Dr. Alcaraz.   Answers for HPI/ROS submitted by the patient on 10/25/2019   General Symptoms: No  Skin Symptoms: No  HENT Symptoms: Yes  EYE SYMPTOMS: No  HEART SYMPTOMS: Yes  LUNG SYMPTOMS:  No  INTESTINAL SYMPTOMS: No  URINARY SYMPTOMS: Yes  REPRODUCTIVE SYMPTOMS: No  SKELETAL SYMPTOMS: Yes  BLOOD SYMPTOMS: No  NERVOUS SYSTEM SYMPTOMS: Yes  MENTAL HEALTH SYMPTOMS: Yes  Ear pain: No  Ear discharge: No  Hearing loss: Yes  Tinnitus: No  Nosebleeds: No  Congestion: No  Sinus pain: No  Hearing aid used: Yes  Fainting: No  Pacemaker: Yes  Trouble with coordination: Yes  Dizziness or trouble with balance: Yes  Speech problems: Yes    Answers for HPI/ROS submitted by the patient on 10/25/2019   General Symptoms: No  Skin Symptoms: No  HENT Symptoms: Yes  EYE SYMPTOMS: No  HEART SYMPTOMS: Yes  LUNG SYMPTOMS: No  INTESTINAL SYMPTOMS: No  URINARY SYMPTOMS: Yes  REPRODUCTIVE SYMPTOMS: No  SKELETAL SYMPTOMS: Yes  BLOOD SYMPTOMS: No  NERVOUS SYSTEM SYMPTOMS: Yes  MENTAL HEALTH SYMPTOMS: Yes  Ear pain: No  Ear discharge: No  Hearing loss: Yes  Tinnitus: No  Nosebleeds: No  Congestion: No  Sinus pain: No  Hearing aid used: Yes  Fainting: No  Pacemaker: Yes  Trouble with coordination: Yes  Dizziness or trouble with balance: Yes  Speech problems: Yes    Answers for HPI/ROS submitted by the patient on 10/25/2019   General Symptoms: No  Skin Symptoms: No  HENT Symptoms: Yes  EYE SYMPTOMS: No  HEART SYMPTOMS: Yes  LUNG SYMPTOMS: No  INTESTINAL SYMPTOMS: No  URINARY SYMPTOMS: Yes  REPRODUCTIVE SYMPTOMS: No  SKELETAL SYMPTOMS: Yes  BLOOD SYMPTOMS: No  NERVOUS SYSTEM SYMPTOMS: Yes  MENTAL HEALTH SYMPTOMS: Yes  Ear pain: No  Ear discharge: No  Hearing loss: Yes  Tinnitus: No  Nosebleeds: No  Congestion: No  Sinus pain: No  Hearing aid used: Yes  Fainting: No  Pacemaker: Yes  Trouble with coordination: Yes  Dizziness or trouble with balance: Yes  Speech problems: Yes

## 2019-10-25 NOTE — PATIENT INSTRUCTIONS

## 2019-10-25 NOTE — NURSING NOTE
Chief Complaint   Patient presents with     Gait Problem     [facility] NEW ATAXIA CONSULT        Brandon Leon, EMT

## 2019-10-25 NOTE — LETTER
"10/25/2019       RE: Raj Ruiz   [facility] [id number]     Dear Colleague,    Thank you for referring your patient, Raj Ruiz, to the [facility] NEUROLOGY at [facility]. Please see a copy of my visit note below.    Department of Neurology  Movement Disorders Division     Patient: Raj Ruiz   MRN: [id number]   : 1926   Date of Visit: 10/25/2019     Dear Colleague,     Thank you for referring your patient, Mr. Ruiz, to the [facility] NEUROLOGY at [facility]. Please see a copy of my visit note below.    Reason for visit: Evaluate for ataxia  Referring Physician: Self-referral    History of Present Illness  Mr. Ruiz is a 89 year old male that presents to Neurology Ataxia clinic as a new patient for evaluation of ataxia.    History obtained from patient and accompanied by 2 friends. 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  He experienced rapid deterioration with coordination of his limbs, mostly lower extremities, about 6 months ago.  He is experienced 2 falls.  One in which he fractured his left hip and another fall which resulted in fracture of his ribs.  He has no family history of ataxia or coordination issues.  He has no children.  Tries to walk about 25 minutes a day and usually has somebody walking with him for safety.  He reports that his balance is worse towards the end of the day or when he is fatigued.  He walks with a walker.  Also notes dysarthria with difficulty communicating, at times.  He regularly participates in speech activities and reports that he bought a \"tongue twister book\" to practice his speech.    He has a history of atrial flutter for which she takes Coumadin.      Review of Systems:  Other than that mentioned above, the remainder of 12 systems reviewed were negative.    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     Past Surgical History:   Procedure Laterality Date     APPENDECTOMY       BIOPSY       COLONOSCOPY       HERNIA REPAIR       IMPLANT PACEMAKER       PROSTATE SURGERY       Family History   Problem Relation Age of Onset     Cerebrovascular Disease Mother      Heart Disease Father      Social History     Socioeconomic History     Marital status: Single     Spouse name: Not on file     Number of children: Not on file     Years of education: Not on file     Highest education level: Not on file   Occupational History     Not on file   Social Needs     Financial resource strain: Not on file     Food insecurity:     Worry: Not on file     Inability: Not on file     Transportation needs:     Medical: Not on file     Non-medical: Not on file   Tobacco Use     Smoking status: Former Smoker     Last attempt to quit: 1960     Years since quittin.8     Smokeless tobacco: Never Used   Substance and Sexual Activity     Alcohol use: Yes     Comment: occ     Drug use: No     Sexual activity: Not Currently   Lifestyle     Physical activity:     Days per week: Not on file     Minutes per session: Not on file     Stress: Not on file   Relationships     Social connections:     Talks on phone: Not on file     Gets together: Not on file     Attends [employer] service: Not on file     Active member of club or organization: Not on file     Attends meetings of clubs or organizations: Not on file     Relationship status: Not on file     Intimate partner violence:     Fear of current or ex partner: Not on file     Emotionally abused: Not on file     Physically abused: Not on file     Forced sexual activity: Not on file   Other Topics Concern     Parent/sibling w/ CABG, MI or  angioplasty before 65F 55M? 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     Medications:  Current Outpatient Medications   Medication Sig Dispense Refill     atorvastatin (LIPITOR) 10 MG tablet Take 10 mg by mouth every other day.       Cholecalciferol (VITAMIN D) 1000 UNITS capsule Take 1 capsule by mouth daily       latanoprost (XALATAN) 0.005 % ophthalmic solution        LUTEIN PO Take  by mouth.       Multiple Vitamins-Minerals (OCUVITE PRESERVISION PO) Take  by mouth.       omeprazole (PRILOSEC) 20 MG capsule        psyllium (METAMUCIL) WAFR Take 2 Wafers by mouth daily       warfarin (COUMADIN) 2.5 MG tablet Take 2.5 mg by mouth daily DAILY FOR NOW       Omega-3 Fatty Acids (FISH OIL PO)        warfarin (COUMADIN) 5 MG tablet Take by mouth daily Taken on Tuesdays and Saturdays           Allergies   Allergen Reactions     Penicillins         Physical Exam:  The patient's  weight is 68.7 kg (151 lb 8 oz). His blood pressure is 154/75 (abnormal) and his pulse is 75. His respiration is 16 and oxygen saturation is 98%.      Physical Exam:  GENERAL: alert, active, attentive, appropriately groomed   HEENT: normocephalic, eyes open with no discharge, nares patent, oropharynx clear-no lesions  CHEST: normal configuration, chest rise equal b/l, non labored breathing   EXTREMITIES: no edema/cyanosis in BUE/BLE, distal pulses intact  PSYCH: mood stable, jovial     Neurologic Exam:  MENTAL STATUS: Alert and oriented to person, place, time, and situation. Follows commands. Recent and remote memory intact. Attention span and concentration intact. Fund of knowledge intact to current events.  SPEECH: Fluent, intact comprehension and moderate dysarthria  CN: visual fields intact in all fields, EOMIB,  Horizontal nystagmus on right gaze, down beat nystagmus with left gaze, normal saccades, PERRL, facial movement symmetric, hearing grossly intact to  conversation with hearing aids b/l, tongue protrudes midline   MOTOR: Moves all extremities equally against gravity without difficulty  REFLEXES (R/L): 3/3  in biceps, brachioradialis, triceps, patellars, 0/0 achilles; no clonus  SENSATION: intact to light touch throughout, no neuropathy or distal shading  COORDINATION: Unable to stand tandem  GAIT: decreased arm swing and length of gait, no en bloc turns, severe ataxic gait, no tandem, wide based gait, ambulates with a walker, slow cautious gait     Data Reviewed:   -CT head reviewed and reveals moderate cerebellar atrophy and age-related diffuse atrophy  -CT a of head and neck reviewed that shows patent anterior and posterior circulation.  -CT cervical spine reported to have normal alignment and no fractures, cervical spondylosis, moderate bilateral neural foraminal narrowing with impingement of the C4 nerve root at C3-4, mild narrowing of spinal canal with mild to moderate narrowing of the bilateral neural foramina at C4-5, moderate narrowing of the right neuroforamina with impingement of the right C6 nerve root at C5-6.  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  Patient now relies on a roller walker for ambulation and reports multiple falls.  CT head reveals moderate cerebellar atrophy.  Differential remains broad at this point and includes a genetic etiology, cerebellar stroke in setting of atrial flutter.  Genetic testing was discussed, however, it was decided that despite the results medical management would not be affected thus patient opted from pursuing this option. At this time, symptomatic treatment is best and was discussed with patient and described below.     1. Ataxia    Plan:   - Avoid falling, keep active safely, recommend walking with a partner at all times  - Consider using a wheelchair to avoid falls  - Perform speech exercises daily    RTC:  as needed.    Linnette Hirsch, DO  Movement Disorders Fellow    I have personally interviewed and examined . Raj Ruiz and agree with diagnosis and management. The total time spent with the patient was 60 minutes, over 50% of the time spent in counseling and coordinating care.    Again, thank you for allowing me to participate in the care of your patient.      Sincerely,    Farheen Alcaraz MD

## 2019-11-20 ENCOUNTER — OFFICE VISIT (OUTPATIENT)
Dept: FAMILY MEDICINE | Facility: CLINIC | Age: 84
End: 2019-11-20
Payer: MEDICARE

## 2019-11-20 ENCOUNTER — HOSPITAL ENCOUNTER (OUTPATIENT)
Dept: GENERAL RADIOLOGY | Facility: CLINIC | Age: 84
Discharge: HOME | End: 2019-11-20
Attending: NURSE PRACTITIONER | Admitting: NURSE PRACTITIONER
Payer: MEDICARE

## 2019-11-20 VITALS
DIASTOLIC BLOOD PRESSURE: 80 MMHG | OXYGEN SATURATION: 98 % | HEART RATE: 76 BPM | WEIGHT: 157.2 LBS | TEMPERATURE: 97.3 F | BODY MASS INDEX: 26.98 KG/M2 | SYSTOLIC BLOOD PRESSURE: 160 MMHG

## 2019-11-20 DIAGNOSIS — R07.81 RIB PAIN ON LEFT SIDE: ICD-10-CM

## 2019-11-20 DIAGNOSIS — S20.219A CONTUSION OF RIB, UNSPECIFIED LATERALITY, INITIAL ENCOUNTER: ICD-10-CM
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
## 2019-11-20 DIAGNOSIS — S20.219A CONTUSION OF RIB, UNSPECIFIED LATERALITY, INITIAL ENCOUNTER: Primary | ICD-10-CM

## 2019-11-20 PROCEDURE — 71101 X-RAY EXAM UNILAT RIBS/CHEST: CPT | Mod: LT

## 2019-11-20 PROCEDURE — 99214 OFFICE O/P EST MOD 30 MIN: CPT | Performed by: NURSE PRACTITIONER

## 2019-11-20 RX ORDER — MEGESTROL ACETATE 20 MG/1
20 TABLET ORAL DAILY
COMMUNITY

## 2019-11-20 NOTE — PROGRESS NOTES
Subjective     Raj Ruiz is a 89 year old male who presents to clinic today for the following health issues:    HPI   Rib pain bilateral sides resulting frrom fall     Onset: day after fall occurred 6 days    Description:   Location: rririb  Character: Sharp and Stabbing    Intensity: ranges from moderate to severe     Progression of Symptoms: worse    Accompanying Signs & Symptoms:  Other symptoms: none    History:   Previous similar pain: no       Precipitating factors:   Trauma or overuse: YES- recent fall 1 week ago     Alleviating factors:  Improved by: rest/inactivity and tylenonol    Therapies Tried and outcome: Tylenol     Fell in Sept on left side  Thinks this time his right rib perhaps pain is kind of a band across the sternum with deep breaths, still has mild pain on left  Hard to sleep at night   Tylenol now regularly not helping   Urinating normally  His friend with him here today did add he Fell on same side this time also hit head, bruise noted left cheek, no LOC changes and pt is in good spirits laughing about it all   Friend requests we call his wife Gunner in chart for results     Reports BP up today from anxiety he has in clinics but hasn't been up at home, [facility] pt and no chest pain or other concerns today, has regular follow up with his Primary Care Provider     INDICATION :  Fall. Left rib pain.    TECHNIQUE :  CT scan of the chest abdomen and pelvis.    IV contrast 100 cc Omnipaque.    COMPARISON :  No comparison    FINDINGS:  CHEST:  Minimal linear nondisplaced fracture of the left 5th anterior lateral rib. Adjacent pleural thickening.  Small pulmonary nodule 2 mm in the left upper lobe image 47.  Emphysema.  Coronary artery disease with calcification.  Large retrocardiac hiatal hernia.  Intracardiac pacemaking leads.  Scarring in the bases.  No pneumothorax.    ABDOMEN:  Fatty attenuation of the liver. Contracted gallbladder, no calcifications.  Renal cysts.  Spleen, pancreas,  adrenal glands unremarkable. No hydronephrosis.  Colonic diverticulosis.  No retroperitoneal adenopathy. Retro aortic left renal vein.      PELVIS:  Phleboliths. The prostate is absent. Bladder is unremarkable. No adenopathy.    SKELETAL: Fixation hardware proximal left femur.    IMPRESSION:  1. Linear nondisplaced left rib fracture with pleural thickening.   2. No pneumothorax.   3. Coronary artery disease with pacemaker.   4. Large hiatal hernia.   5. Fatty liver. No acute solid organ injury of the abdomen or pelvis.   6. Prior prostate resection.    Please note that all CT scans at this facility use dose modulation, iterative reconstruction, and/or weight-based dosing when appropriate to reduce radiation dose to as low as reasonably achievable.    Dictated by Gil Randall MD @ Sep  8 2019  5:49PM    Signed by Dr. 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     Fall     Hearing loss     Hyperlipidemia     Hypertension     Left rib fracture     Low vision, one eye     Malignant neoplasm of prostate (H)     Other specified disorder of rectum and anus     Primary osteoarthritis of right knee     Routine general medical examination at a health care facility     Speech abnormality     Squamous cell carcinoma in situ of skin     Syncope     Tear of lateral meniscus of left knee     Vitamin D deficiency     Past Surgical History:   Procedure Laterality Date     APPENDECTOMY       BIOPSY       COLONOSCOPY       HERNIA REPAIR       IMPLANT PACEMAKER       PROSTATE SURGERY         Social History     Tobacco Use      Smoking status: Former Smoker     Last attempt to quit: 1960     Years since quittin.0     Smokeless tobacco: Never Used   Substance Use Topics     Alcohol use: Yes     Comment: occ     Family History   Problem Relation Age of Onset     Cerebrovascular Disease Mother      Heart Disease Father          Current Outpatient Medications   Medication Sig Dispense Refill     atorvastatin (LIPITOR) 10 MG tablet Take 10 mg by mouth every other day.       Cholecalciferol (VITAMIN D) 1000 UNITS capsule Take 1 capsule by mouth daily       cyanocobalamin 1000 MCG SUBL Place 1,000 mg under the tongue daily       latanoprost (XALATAN) 0.005 % ophthalmic solution        megestrol (MEGACE) 20 MG tablet Take 20 mg by mouth daily       Misc Natural Products (LUTEIN VISION BLEND) CAPS Take 2 capsules by mouth daily preservision brand       Omega-3 Fatty Acids (FISH OIL PO)        omeprazole (PRILOSEC) 20 MG capsule        psyllium (METAMUCIL) WAFR Take 2 Wafers by mouth daily       warfarin (COUMADIN) 2.5 MG tablet Take 2.5 mg by mouth daily DAILY FOR NOW       Allergies   Allergen Reactions     Penicillins      BP Readings from Last 3 Encounters:   19 (!) 160/80   10/25/19 (!) 154/75   19 112/68    Wt Readings from Last 3 Encounters:   19 71.3 kg (157 lb 3.2 oz)   10/25/19 68.7 kg (151 lb 8 oz)   16 66.7 kg (147 lb 1.6 oz)                    Reviewed and updated as needed this visit by Provider         Review of Systems   ROS COMP: Constitutional, HEENT, cardiovascular, pulmonary, gi and gu systems are negative, except as otherwise noted.      Objective    BP (!) 160/80   Pulse 76   Temp 97.3  F (36.3  C) (Oral)   Wt 71.3 kg (157 lb 3.2 oz)   SpO2 98%   BMI 26.98 kg/m    Body mass index is 26.98 kg/m .  Physical Exam   GENERAL: healthy, alert and no distress  NECK: no adenopathy, no asymmetry, masses, or scars and thyroid normal to palpation  RESP: lungs clear to auscultation - no rales, rhonchi  or wheezes  CV: regular rate and rhythm, normal S1 S2, no S3 or S4, no murmur, click or rub, no peripheral edema and peripheral pulses strong  ABDOMEN: soft, nontender, no hepatosplenomegaly, no masses and bowel sounds normal  MS: tenderness over lower 3 ribs on left   SKIN: mild bruises on face and rib mostly resolved otherwise intact   NEURO: Normal strength and tone, mentation intact and speech normal    Diagnostic Test Results:  Labs reviewed in Epic  Xray - pending         Assessment & Plan       ICD-10-CM    1. Contusion of rib, unspecified laterality, initial encounter S20.219A XR Ribs & Chest Left G/E 3 Views   2. Fall, initial encounter W19.XXXA XR Ribs & Chest Left G/E 3 Views   3. Rib pain on left side R07.81 XR Ribs & Chest Left G/E 3 Views   pt follow up for rib pain after a fall, also has had previous fall when he fx ribs on this side, was not initially going to Xray although pt does have anxiety and family friend requesting imaging due to pt's anxiety needs to know expected healing times, does want Xray to recheck previous fx as well, is reasonable. Likely bruise and pt declines any more pain med than tylenol. Has plans in place for homecare [facility], OT to check home environment recommended       Patient Instructions     Patient Education     Rib Contusion     A rib contusion is a bruise to one or more rib bones. It may cause pain, tenderness, swelling and a purplish discoloration. There may be a sharp pain while breathing.  You will be assessed for other injuries. You will likely be given pain medicine. Rib contusions heal on their own, without further treatment. However, pain may take weeks to months to go away.   Note that a small crack (fracture) in the rib may cause the same symptoms as a rib contusion. The small crack may not be seen on a chest X-ray. However, the conditions are managed in the same way.  Home care    Rest. Avoid heavy lifting, strenuous exertion, or  any activity that causes pain.    Ice the area to reduce pain and swelling. Put ice cubes in a plastic bag or use a cold pack. (Wrap the cold source in a thin towel. Do not place it directly on your skin.) Ice the injured area for 20 minutes every 1 to 2 hours the first day. Continue with ice packs 3 to 4 times a day for the next 2 days, then as needed for the relief of pain and swelling.    Take any prescribed pain medicine as directed by your healthcare provider. If none was prescribed, take acetaminophen, ibuprofen, or naproxen to control pain.    If you have a significant injury, you may be given a device called an incentive spirometer to keep your lungs healthy. Use as directed.  Follow-up care  Follow up with your healthcare provider during the next week or as directed.  When to seek medical advice  Call your healthcare provider for any of the following:    Shortness of breath or trouble breathing    Increasing chest pain with breathing    Coughing    Dizziness, weakness, or fainting    New or worsening pain    Fever of 100.4 F (38 C) or higher, or as directed by your healthcare provider  Date Last Reviewed: 2/1/2017 2000-2018 [employer]. [address] [place] [id number]. All rights reserved. This information is not intended as a substitute for professional medical care. Always follow your healthcare professional's instructions.               Return for next annual exam or as needed.    I spent 35 min in direct face to face time with this pt, greater than 50% in counseling and coordination of care of above diagnoses    ANAI Duncan [facility]

## 2019-11-20 NOTE — PATIENT INSTRUCTIONS
Patient Education     Rib Contusion     A rib contusion is a bruise to one or more rib bones. It may cause pain, tenderness, swelling and a purplish discoloration. There may be a sharp pain while breathing.  You will be assessed for other injuries. You will likely be given pain medicine. Rib contusions heal on their own, without further treatment. However, pain may take weeks to months to go away.   Note that a small crack (fracture) in the rib may cause the same symptoms as a rib contusion. The small crack may not be seen on a chest X-ray. However, the conditions are managed in the same way.  Home care    Rest. Avoid heavy lifting, strenuous exertion, or any activity that causes pain.    Ice the area to reduce pain and swelling. Put ice cubes in a plastic bag or use a cold pack. (Wrap the cold source in a thin towel. Do not place it directly on your skin.) Ice the injured area for 20 minutes every 1 to 2 hours the first day. Continue with ice packs 3 to 4 times a day for the next 2 days, then as needed for the relief of pain and swelling.    Take any prescribed pain medicine as directed by your healthcare provider. If none was prescribed, take acetaminophen, ibuprofen, or naproxen to control pain.    If you have a significant injury, you may be given a device called an incentive spirometer to keep your lungs healthy. Use as directed.  Follow-up care  Follow up with your healthcare provider during the next week or as directed.  When to seek medical advice  Call your healthcare provider for any of the following:    Shortness of breath or trouble breathing    Increasing chest pain with breathing    Coughing    Dizziness, weakness, or fainting    New or worsening pain    Fever of 100.4 F (38 C) or higher, or as directed by your healthcare provider  Date Last Reviewed: 2/1/2017 2000-2018 [employer]. [address] [place] [address], [place] [address]. All rights reserved. This information is not intended as a  substitute for professional medical care. Always follow your healthcare professional's instructions.

## 2019-11-21 ENCOUNTER — TELEPHONE (OUTPATIENT)
Dept: FAMILY MEDICINE | Facility: CLINIC | Age: 84
End: 2019-11-21

## 2019-11-21 NOTE — TELEPHONE ENCOUNTER
Left  for Maicol to return call to clinic. Huddled with yanni Cardona to give results for xray. Triage patient and advise on fracture ribs protocol    Danyell Lay RN   [facility]

## 2019-11-21 NOTE — TELEPHONE ENCOUNTER
Reason for call:  Results   Name of test or procedure: X-ray  Date of test or procedure: 11/20/2019  Location of test or procedure: [place]    Additional comments: n/a    Phone number to reach patient:  Other phone number:  [phone]*    Best Time:  n/a    Can we leave a detailed message on this number?  YES     Please call Maicol Carrera there is consent to communicate on file

## 2019-11-21 NOTE — TELEPHONE ENCOUNTER
Spoke with aMicol and gave xray results. She is wanting a call back from Jenna on Friday. She has questions regarding xray results from yesterday vs xray results in September when patient fell and fractured his ribs. Is the 5th rib a new fracture or the old one, and why was patient in a lot more pain in September vs now when he fell and has more ribs fractured.     Patients pain seems to be well controlled with Tylenol. Pain only gets worse when he moves.    Thanks  Danyell Lay RN   [facility]

## 2019-11-22 NOTE — TELEPHONE ENCOUNTER
Called family Maicol back. Maicol was very upset on the phone. Maicol became verbally abusive to writer as writer was trying to explain and relay message from provider to patient's family. Writer accidentally hung-up on family member when trying to transfer call to supervisor. Writer gave provider and supervisor family complaints. Maicol was clear that she does not want a nurse calling her and answering questions, she wants only the provider. Emily Lopez RN on 11/22/2019 at 12:06 PM

## 2019-11-22 NOTE — TELEPHONE ENCOUNTER
Maicol called again stating that she still has not received a call back regarding this issue. She would like a call back ASAP today before the weekend so she can get these questions answered. She will be available before 12:45pm and then again after 2pm. Can reach her at [phone].

## 2019-11-22 NOTE — TELEPHONE ENCOUNTER
Routing to Provider. Please call family member Maicol when able. She does not accept information provided by nurses. Emily Lopez RN on 11/22/2019 at 12:06 PM

## 2019-11-22 NOTE — TELEPHONE ENCOUNTER
"Spoke with Maicol, of note was 30 min phone call Discussed changes in Xray compared with CT in Care Everywhere from [facility]--6th rib and possibly 7th fracture as well as 5th that may or may not be the old fracture showing, discussed difficulties comparing to out of system scans and I do not read CTs, defer comparing to old scans to Radiologist and if he wishes to follow up with his Primary Care Provider he can as well if continuing to have issues.   Did relay info to Maicol that pt was moving and breathing very well, very minimal pain in clinic but that I did  on possible anxiety so it's important to manage his expectations for healing--unfortunately we do not know exactly how long the healing will take, reviewed cares and deep breathing, ways to help with sleep, tylenol, offered again PT and she declines.     She added complaints that she felt staff was \"condiscending to her on the phone\" and she felt someone was trying to answer her questions before she had a chance to ask them. She did not feel heard and was upset about this, Genet did speak with her already and I encouraged Maicol to follow up if any further concerns.     Closing this , Routing back to RN and Genet just as FYI or in case anything furhter is needed.       "

## 2019-11-22 NOTE — TELEPHONE ENCOUNTER
Please tell pt's family that nothing new is needed, continue to take the tylenol as needed for pain. I'd expect this rib injury to not take nearly as long to recover from compared with his last injury.     He can follow up here or with his Primary Care Provider if any further concerns    Thanks  Jenna OSPINA CNP

## 2019-12-20 ENCOUNTER — OFFICE VISIT (OUTPATIENT)
Dept: AUDIOLOGY | Facility: CLINIC | Age: 84
End: 2019-12-20
Payer: MEDICARE

## 2019-12-20 ENCOUNTER — TELEPHONE (OUTPATIENT)
Dept: AUDIOLOGY | Facility: CLINIC | Age: 84
End: 2019-12-20

## 2019-12-20 DIAGNOSIS — H90.3 SENSORINEURAL HEARING LOSS, BILATERAL: Primary | ICD-10-CM

## 2019-12-20 NOTE — TELEPHONE ENCOUNTER
Left VM asking if patient would like earlier appointment today as there was a cancellation and he could be seen at 1pm. Requested call back if he would like to change his appt time.    Olive Thomas  Audiologist  [place] License  #[id number]

## 2019-12-20 NOTE — PROGRESS NOTES
AUDIOLOGY REPORT    BACKGROUND INFORMATION: Raj Ruiz was seen in the Audiology Clinic at the [facility] on 12/20/2019 for follow-up.  The patient has been seen previously in this clinic on 11/11/2016 and results revealed a bilateral sensorineural hearing loss.  The patient was fit with binaural Phonak Virto Q90-13 at an outside clinic in 2014.   The patient reports a continued beeping sound from his left hearing aid which mostly happens when he is moving. He was accompanied to today's appointment by family member.    The case of the left hearing aid is broken, they do not want to repair it at this time as he is interested in new hearing aids.    TEST RESULTS AND PROCEDURES:  A listening check of the left hearing aid revealed a slight echo but no distortion or weakness was noted. Only the left hearing aid was checked because the patient is self-pay for today's appointment.  Adjustments were made including re-running the left feedback manager (gain was decreased but feedback was eliminated) and the patient reported good sound quality.  It was difficulty to pinpoint exactly what was causing the beep- the patient confirmed that the beep he heard was the program change beep.  Patient wanted to leave his Music Program in as an option.  The sound notifications were taken out of the left hearing aid but remain in the right hearing aid. He reported that the beep would randomly occur, even while he was just sitting in the clinician's office. It was discussed that the beep could have been some sort of feedback or possibly the hearing aid malfunctioning, even with troubleshooting the provider could not replicate the beep sound while doing a listening check.  Patient expressed understanding and was satisfied with the changes made to attempt to stop the beeping sound.    Patient is interested in new hearing aids, the process of obtaining new hearing aids was reviewed and he  expressed understanding.    SUMMARY AND RECOMMENDATIONS: A hearing aid follow-up was performed today.  Adjustments were made as noted above and the patient will return for a hearing aid evaluation when he returns home from a 6 week trip.  Hearing aid appointments were scheduled at the conclusion of today's appointment.  Call this clinic with questions regarding today s visit.      Olive Thomas  Audiologist  [place] License  #[id number]

## 2020-02-08 ENCOUNTER — HEALTH MAINTENANCE LETTER (OUTPATIENT)
Age: 85
End: 2020-02-08

## 2020-02-12 ENCOUNTER — OFFICE VISIT (OUTPATIENT)
Dept: AUDIOLOGY | Facility: CLINIC | Age: 85
End: 2020-02-12
Payer: MEDICARE

## 2020-02-12 DIAGNOSIS — H90.3 SENSORINEURAL HEARING LOSS, BILATERAL: Primary | ICD-10-CM

## 2020-02-12 NOTE — PROGRESS NOTES
AUDIOLOGY REPORT    SUBJECTIVE:  Raj Ruiz is a 89 year old male who was seen in the Audiology Clinic at the [facility] for audiologic evaluation and hearing aid consultation, referred by Jenna Garibay CNP.  The patient has been seen previously in this clinic on 11/11/2016 for assessment and results indicated a bilateral sensorineural hearing loss.  He currently wears binaural Phonak Virto Q90 hearing aids fit at an outside clinic in 2014. The left shell is currently broken but the device still functions. The patient reports a decrease in hearing status bilaterally. The patient denies bilateral tinnitus, bilateral otalgia, bilateral drainage, bilateral aural fullness, and dizziness.  The patient notes difficulty with communication in a variety of listening situations.  They were accompanied today by their family member.    OBJECTIVE:    Fall Risk Screen:  1. Have you fallen two or more times in the past year? Yes  2. Have you fallen and had an injury in the past year? Yes  Patient is already being followed by his PCP regarding the falls so no referrals were placed today.    Otoscopic exam indicates ears are clear of cerumen bilaterally     Pure Tone Thresholds assessed using conventional audiometry with good  reliability from 250-8000 Hz bilaterally using insert earphones and circumaural headphones     RIGHT:  Moderately-severe to severe sensorineural hearing loss    LEFT:    Moderately-severe to severe sensorineural hearing loss    Tympanogram:    RIGHT: normal eardrum mobility    LEFT:   normal eardrum mobility    Speech Reception Threshold:    RIGHT: 70 dB HL    LEFT:   70 dB HL    Word Recognition Score:     RIGHT: 72% at 90 dB HL using NU-6 recorded word list.    LEFT:   72% at 90 dB HL using NU-6 recorded word list.    Patient is a hearing aid candidate. Patient would like to move forward with a hearing aid evaluation today. Therefore, the patient was  presented with different options for amplification to help aid in communication. Discussed styles, levels of technology and monaural vs. binaural fitting. He has worn the ITE style for many years and would like to stay with that style even though it means changing the batteries.    He leads a very active life. He watches TV in the evenings. He does go out to many musical performances at [employer] and attends shows at the [employer]. He also eats out frequently and finds noisy environments challenging.    The hearing aid(s) mutually chosen were:  Binaural: ReSound Linx 3D 9 ITE  COLOR: Pink  BATTERY SIZE: 13    Otoscopy revealed ears are clear of cerumen bilaterally. Bilateral earmolds were taken without incident.      ASSESSMENT:     Compared to patient's previous audiogram dated 11/11/16, hearing has remained stable. Today s results were discussed with the patient in detail.     Reviewed purchase information and warranty information with patient. The 45 day trial period was explained to patient. The patient was given a copy of the [place] Department [employer] consumer brochure on purchasing hearing instruments. Patient risk factors have been provided to the patient in writing prior to the sale of the hearing aid per FDA regulation. The risk factors are also available in the User Instructional Booklet to be presented on the day of the hearing aid fitting. Hearing aid(s) ordered. Hearing aid evaluation completed.    PLAN:  Patient was counseled regarding hearing loss and impact on communication.  Patient is a good candidate for amplification at this time. Handout on good communication strategies, and hearing aid use was given to patient. It is recommended that the patient continue to monitor hearing status every 2-3 years, sooner if concerns arise.  Raj is scheduled to return in 2-3 weeks for a hearing aid fitting and programming. Purchase agreement will be completed on that date. Please contact this clinic  with any questions or concerns.        Olive Thomas  Audiologist  [place] License  #[id number]

## 2020-03-02 ENCOUNTER — OFFICE VISIT (OUTPATIENT)
Dept: AUDIOLOGY | Facility: CLINIC | Age: 85
End: 2020-03-02
Payer: MEDICARE

## 2020-03-02 DIAGNOSIS — H90.3 SENSORINEURAL HEARING LOSS, BILATERAL: Primary | ICD-10-CM

## 2020-03-02 NOTE — PROGRESS NOTES
AUDIOLOGY REPORT    SUBJECTIVE: Raj Ruiz is a 89 year old male who was seen in the Audiology Clinic at the [facility] for a fitting of binaural Resound Linx 3D 9 in-the-ear hearing aids. Previous results have revealed a bilateral sensorineural hearing loss. The patient was given medical clearance to pursue amplification by  Vinh Sotelo MD. At [facility].    OBJECTIVE: The hearing aid conformity evaluation was completed.The hearing aids were placed and they provided a good fit. Real-ear-probe-microphone measurements were completed on the [employer] system and were a good match to NAL-NL1 target with soft sounds audible, moderate sounds comfortable, and loud sounds below discomfort. UCLs are verified through maximum power output measures and demonstrate appropriate limiting of loud inputs. Raj was oriented to proper hearing aid use, care, cleaning (no water, dry brush), batteries (size 13, insertion/removal, toxicity, low-battery signal), aid insertion/removal, user booklet, warranty information, storage cases, and other hearing aid details. The patient confirmed understanding of hearing aid use and care, and showed proper insertion of hearing aid and batteries while in the office today.Raj reported good volume and sound quality today. Slight feedback was noted in the right ear. Otoscopy revealed minor cerumen, which was removed without incident using a lighted curette. Following removal of wax, feedback subsided. It should be noted that upon connection to programming software a notification was given that telecoil was not active in the hearing instruments.     Hearing aids were programmed as follows:  Program 1: All-around  Program 2: Acoustic phone (this program is for patient to try telecoil with. If unsuccessful, new hearing aid will be ordered with telecoil)     EAR(S) FIT: Bilateral  HEARING AID MODEL NAME: Resound Linx 3D  9  HEARING AID STYLE: Full Shell in-the-ear  EARMOLDS/TIP: full shell  SERIAL NUMBERS: Right: [id number] Left: [id number]  WARRANTY END DATE: 3/21/2023    ASSESSMENT: Binaural Resound Linx 3D 9 in-the-ear hearing aid(s) were fit today. Verification measures were performed. Raj signed the Hearing Aid Purchase Agreement and was given a copy, as well as details on his hearing aids. Patient was counseled that exact out of pocket amounts cannot be determined for hearing aid claims being sent to insurance. Any insurance coverage information presented to the patient is an estimate only, and is not a guarantee of payment. Patient has been advised to check with their own insurance.    PLAN:Raj will return for follow-up in 2-3 weeks for a hearing aid review appointment. At this appointment, patient will report on telecoil function. At this time additional noise programs will be added if needed. Please call this clinic with questions regarding today s appointment.    Marivel Galvez M.S.  Audiology Doctoral Extern  License #[id number]        I was present with the patient for the entire Audiology appointment including all procedures/testing performed by the AuD student, and agree with the student s assessment and plan as documented.      Nimisha Breen, Olive  Audiologist  [place] License  #[id number]

## 2020-03-03 ENCOUNTER — OFFICE VISIT (OUTPATIENT)
Dept: FAMILY MEDICINE | Facility: CLINIC | Age: 85
End: 2020-03-03
Payer: MEDICARE

## 2020-03-03 VITALS
OXYGEN SATURATION: 99 % | HEART RATE: 70 BPM | SYSTOLIC BLOOD PRESSURE: 138 MMHG | TEMPERATURE: 98.6 F | DIASTOLIC BLOOD PRESSURE: 82 MMHG

## 2020-03-03 DIAGNOSIS — H00.014 HORDEOLUM EXTERNUM OF LEFT UPPER EYELID: Primary | ICD-10-CM

## 2020-03-03 PROCEDURE — 99213 OFFICE O/P EST LOW 20 MIN: CPT | Performed by: PHYSICIAN ASSISTANT

## 2020-03-03 RX ORDER — ERYTHROMYCIN 5 MG/G
0.5 OINTMENT OPHTHALMIC
Qty: 3.5 G | Refills: 0 | Status: SHIPPED | OUTPATIENT
Start: 2020-03-03

## 2020-03-03 NOTE — PATIENT INSTRUCTIONS
Use erythomycin 6 x today  Drop to 4x daily tomorrow  Over the counter hydrocortisone cream to eyelid (thin layer) twice daily being careful not to get it in the eye for 2 days  Schedule appointment with ophthalmologist this week  Continue warm compress  Return to clinic for any new or worsening symptoms or go to ER Urgent care in off hours    Patient Education     Sty (or Stye)  A sty is an infection of the oil gland of the eyelid. It may develop into a small pocket of pus (an abscess). This can cause pain, redness, and swelling. In early stages, a sty is treated with antibiotic cream, eye drops, or a small towel soaked in warm water (a warm compress). More severe cases may need to be opened and drained by a healthcare provider.  Home care    Eye drops or ointment are usually prescribed to treat the infection. Use these as directed.     Artificial tears may also be used to lubricate the eye and make it more comfortable. You can buy these over the counter without a prescription. Talk with your healthcare provider before using any over-the-counter treatment for a sty.    Apply a warm, damp towel to the affected eye for at least 5 minutes, 3 to 4 times a day for a week. Warm compresses open the pores and speed the healing. But if the compresses are too hot, they may burn your eyelid.    Sometimes the sty will drain with this treatment alone. If this happens, keep using the antibiotic until all the redness and swelling are gone.    Wash your hands before and after touching the infected eyelid to avoid spreading the infection.    Don t squeeze or try to break open the sty.  Follow-up care  Follow up with your healthcare provider, or as advised.   When to seek medical advice  Call your healthcare provider right away if any of these occur:    Increase in swelling or redness around the eyelid after 48 to 72 hours    Increase in eye pain or the eyelid blisters    Increase in warmth--the eyelid feels hot    Drainage of blood  or thick pus from the sty    Blister on the eyelid    Inability to open the eyelid due to swelling    Fever of 100.4 F (38 C) or above, or as directed by your provider    Vision changes    Headache or stiff neck    The sty comes back  Date Last Reviewed: 8/1/2017 2000-2019 [employer]. [address] [place], [place] [address]. All rights reserved. This information is not intended as a substitute for professional medical care. Always follow your healthcare professional's instructions.

## 2020-03-03 NOTE — PROGRESS NOTES
Subjective     Raj Ruiz is a 89 year old male who presents to clinic today for the following health issues:    HPI   Eye(s) Problem  Onset: 2/252020    Description:   Location: left  Pain: YES- for a couple a hours but nothing since.   Redness: YES    Accompanying Signs & Symptoms:  Discharge/mattering: YES. Liquid.   Swelling: YES  Visual changes: no   Fever: no   Nasal Congestion: no  Bothered by bright lights: no     History:   Trauma: no     Foreign body exposure: YES did feel like something was in his eye    Precipitating factors:   Wearing contacts: no     Alleviating factors:  Improved by: washed with baby shampoo    Therapies Tried and outcome: Tried saline which irritated it.         Problem list and histories reviewed & adjusted, as indicated.  Additional history: as documented    ROS:  C: NEGATIVE for fever, chills, change in weight  ENT/MOUTH: NEGATIVE for ear pain , hoarse voice, nasal congestion, rhinorrhea-clear and tooth pain  R: NEGATIVE for significant cough or SOB  CV: NEGATIVE for chest pain, palpitations or peripheral edema    Patient Active Problem List   Diagnosis     Abnormal gait     Diarrhea     Radiation proctitis     Atrial flutter (H)     Cardiac pacemaker in situ     Long term current use of anticoagulant therapy     Ataxia     Left hip pain     Lactose intolerance     Right knee pain     Pacemaker     Cardiac conduction disorder     Closed left hip fracture (H)     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     Fall     Hearing loss     Hyperlipidemia     Hypertension     Left rib fracture     Low vision, one eye     Malignant neoplasm of prostate (H)     Other specified disorder of rectum and anus     Primary osteoarthritis of right knee     Routine general medical examination at a health care facility     Speech abnormality     Squamous cell carcinoma in situ of skin     Syncope     Tear of lateral meniscus of left knee     Vitamin D deficiency     Past  Surgical History:   Procedure Laterality Date     APPENDECTOMY       BIOPSY       COLONOSCOPY       HERNIA REPAIR       IMPLANT PACEMAKER       PROSTATE SURGERY         Social History     Tobacco Use     Smoking status: Former Smoker     Last attempt to quit: 1960     Years since quittin.2     Smokeless tobacco: Never Used   Substance Use Topics     Alcohol use: Yes     Comment: occ     Family History   Problem Relation Age of Onset     Cerebrovascular Disease Mother      Heart Disease Father            Problem list, Medication list, Allergies, and Medical/Social/Surgical histories reviewed in [facility] and updated as appropriate.    OBJECTIVE:                                                    /82   Pulse 70   Temp 98.6  F (37  C) (Oral)   SpO2 99%  There is no height or weight on file to calculate BMI.   GENERAL APPEARANCE: healthy, alert and no distress  EYES: PERRL,left upper lid with 5 mm soft nodule, red, non-tender as well as a 2 mm pustule at the edge of the upper lid extending into the nodule as listed. Mild surrounding erythema. No periorbital tenderness. lashes normal, without crusting, visual fields normal and without injection of  conjunctiva or corneas.EOM's intact. Right eye normal  HENT: nose and mouth without ulcers or lesions, ear canal and TM's normal, rhinorrhea clear, oropharynx normal without exudates, edema or erythema  NECK: None cervical lymphadenopathy. No nuchal rigidity  RESP: lungs clear to auscultation - no rales, rhonchi or wheezes  CV: regular rates and rhythm, normal S1 S2, no S3 or S4 and no murmur, click or rub  SKIN: no suspicious lesions or rashes      No results found for this or any previous visit (from the past 24 hour(s)).       ASSESSMENT/PLAN:                                                        ICD-10-CM    1. Hordeolum externum of left upper eyelid H00.014 erythromycin (ROMYCIN) 5 MG/GM ophthalmic ointment       Patient Instructions   Use erythomycin 6 x  today  Drop to 4x daily tomorrow  Over the counter hydrocortisone cream to eyelid (thin layer) twice daily being careful not to get it in the eye for 2 days  Schedule appointment with ophthalmologist this week  Continue warm compress  Return to clinic for any new or worsening symptoms or go to ER Urgent care in off hours    Patient Education     Sty (or Stye)  A sty is an infection of the oil gland of the eyelid. It may develop into a small pocket of pus (an abscess). This can cause pain, redness, and swelling. In early stages, a sty is treated with antibiotic cream, eye drops, or a small towel soaked in warm water (a warm compress). More severe cases may need to be opened and drained by a healthcare provider.  Home care    Eye drops or ointment are usually prescribed to treat the infection. Use these as directed.     Artificial tears may also be used to lubricate the eye and make it more comfortable. You can buy these over the counter without a prescription. Talk with your healthcare provider before using any over-the-counter treatment for a sty.    Apply a warm, damp towel to the affected eye for at least 5 minutes, 3 to 4 times a day for a week. Warm compresses open the pores and speed the healing. But if the compresses are too hot, they may burn your eyelid.    Sometimes the sty will drain with this treatment alone. If this happens, keep using the antibiotic until all the redness and swelling are gone.    Wash your hands before and after touching the infected eyelid to avoid spreading the infection.    Don t squeeze or try to break open the sty.  Follow-up care  Follow up with your healthcare provider, or as advised.   When to seek medical advice  Call your healthcare provider right away if any of these occur:    Increase in swelling or redness around the eyelid after 48 to 72 hours    Increase in eye pain or the eyelid blisters    Increase in warmth--the eyelid feels hot    Drainage of blood or thick pus from  "the sty    Blister on the eyelid    Inability to open the eyelid due to swelling    Fever of 100.4 F (38 C) or above, or as directed by your provider    Vision changes    Headache or stiff neck    The sty comes back  Date Last Reviewed: 8/1/2017 2000-2019 [employer]. [address] [place] [address]. All rights reserved. This information is not intended as a substitute for professional medical care. Always follow your healthcare professional's instructions.                 Estimated body mass index is 26.98 kg/m  as calculated from the following:    Height as of 6/16/15: 1.626 m (5' 4\").    Weight as of 11/20/19: 71.3 kg (157 lb 3.2 oz).       Devorah Monzon [facility]      "

## 2020-03-09 ENCOUNTER — TELEPHONE (OUTPATIENT)
Dept: FAMILY MEDICINE | Facility: CLINIC | Age: 85
End: 2020-03-09

## 2020-03-09 NOTE — TELEPHONE ENCOUNTER
Pt's son requesting paperwork. Printed and left at the  by Juanita DIXON Pt's son picked up today 3/9 @ 11:56.

## 2020-03-18 ENCOUNTER — TELEPHONE (OUTPATIENT)
Dept: AUDIOLOGY | Facility: CLINIC | Age: 85
End: 2020-03-18

## 2020-03-18 NOTE — TELEPHONE ENCOUNTER
Attempted to call patient regarding 3/23 appointment to discuss that due to Covid-19, if it is not an emergency situation, it would be recommended that the appointment be rescheduled to a later date. Patient did not answer the phone and there was no option for voicemail.      Nimisha Breen, [place]  Audiologist  [place] License  #[id number]

## 2020-03-20 ENCOUNTER — TELEPHONE (OUTPATIENT)
Dept: AUDIOLOGY | Facility: CLINIC | Age: 85
End: 2020-03-20

## 2020-03-20 NOTE — TELEPHONE ENCOUNTER
[facility] Call Center    Phone Message    May a detailed message be left on voicemail: yes     Reason for Call: Other:     Albino is wondering about the 45 day warrenty on Sg's hearing aids.       Albino also wants to update Sabrina with what is happening with Sg's hearing aids.     Please call back to discuss.       Action Taken: Message routed to:  [facility] Audiology    Travel Screening: Not Applicable

## 2020-03-20 NOTE — TELEPHONE ENCOUNTER
Spoke with Albino, let him know that the 45 days will re-set when we change out the hearing aids. He reports that while the new hearing aids are helpful, having the separate programs are slightly confusing to Sg. He also notes some feedback in the right ear which may be due to wax. We will address concerns on 4/20/20 and Albino was in agreement with the plan.      Olive Thomas  Audiologist  [place] License  #[id number]

## 2020-03-25 ENCOUNTER — TELEPHONE (OUTPATIENT)
Dept: AUDIOLOGY | Facility: CLINIC | Age: 85
End: 2020-03-25

## 2020-03-25 NOTE — TELEPHONE ENCOUNTER
LVM regarding need to cancel 4/20/2020 appointment. Provided number to reschedule. Explained that he should inform Audiology if he is having issues with the hearing aids/considering returning them.      Elisa Duke, [facility]  Licensed Audiologist  [place] #[id number]

## 2020-04-30 ENCOUNTER — TELEPHONE (OUTPATIENT)
Dept: AUDIOLOGY | Facility: CLINIC | Age: 85
End: 2020-04-30

## 2020-04-30 NOTE — TELEPHONE ENCOUNTER
[facility] Call Center    Phone Message    May a detailed message be left on voicemail: yes     Reason for Call: Other: Pt's dtr Maicol was transferred to ENT to schedule an ear wax cleaning before the 06/03/20  appt with Nimisha Blackwell.  Per our protocols we are to schedule these 12 weeks out, Maicol was a bit frustrated that she was transferred to the ENT line to be told different than what was told to her before she was transferred.  Please call pt back to get pt scheduled for the appt on June 3rd.  She was also told to set pt up every 3-6 months ongoing for the ear wax cleaning     Action Taken: Message routed to:  [facility] ENT    Travel Screening: Not Applicable

## 2020-06-03 ENCOUNTER — TELEPHONE (OUTPATIENT)
Dept: AUDIOLOGY | Facility: CLINIC | Age: 85
End: 2020-06-03

## 2020-06-03 NOTE — TELEPHONE ENCOUNTER
"Called back again, talked to \"Maicol\". Maicol reports that the battery insertion is different on his new hearing aids compared to the old pair. Insists that the new ones have a \"Kenaitze\" that you insert and the old had a half-Kenaitze that was easier for insertion. He also does not prefer the size of the new devices or the sound quality. Offered to repair devices that patient prefers, Maicol will offer this to patient. Maicol asked that we return the devices that are waiting in office to be fit and they will probably send in devices sent in March for return as well as an older pair of devices for repair.    Sanjuanita Lopes.  Licensed Audiologist  [place] #[id number]      [facility] Call Center    Phone Message    May a detailed message be left on voicemail: yes     Reason for Call: Other: Pt's friend Maicol returning call from provider. She had additional questions regarding pt's current hearing aids and would like to speak to provider directly. Please call her mobile, [phone].    Action Taken: Message routed to:  [facility] Aud    Travel Screening: Not Applicable  "

## 2020-06-03 NOTE — TELEPHONE ENCOUNTER
LVM, today's appointment that was cancelled was to fit a different set of hearing aids and return the current hearing aids fit in March. We cannot order the old hearing aids patient is requesting as they are no longer available. Patient can send current devices in or drop them off for return and call to schedule a hearing aid fitting to be fit with the new devices that were to be fit today.    Sanjuanita Lopes.  Licensed Audiologist  [place] #[id number]      [facility]    Phone Message    May a detailed message be left on voicemail: yes     Reason for Call: Other: Pt's friend Maicol requesting call from audiologist.  She had cancelled pt's appt in clinic today to see Nathalie Montgomery, as pt is not feeling well.   Maicol wanted to ask how they would be able to return pt's new set of hearing aids to clinic. Pt has had difficulty working with this set; Maicol states he cannot put the batteries in. Maicol is asking if they can get a new pair of his older hearing aids, and how they could coordinate this. Please call Maicol to discuss.     Action Taken: Message routed to:  [facility] Audiology    Travel Screening: Not Applicable

## 2020-06-12 ENCOUNTER — TELEPHONE (OUTPATIENT)
Dept: AUDIOLOGY | Facility: CLINIC | Age: 85
End: 2020-06-12

## 2020-06-12 NOTE — TELEPHONE ENCOUNTER
Called patient to schedule IRP with Nathalie Montgomery during the week of June 22 (OK TO USE WINNIE SLOTS PER CASEY SUAREZ). Left a VM with scheduling instructions and [facility] call center number.

## 2020-06-16 ENCOUNTER — TELEPHONE (OUTPATIENT)
Dept: AUDIOLOGY | Facility: CLINIC | Age: 85
End: 2020-06-16

## 2020-06-16 NOTE — TELEPHONE ENCOUNTER
[facility] Call Center    Phone Message    May a detailed message be left on voicemail: yes     Reason for Call:   This information was taken by Donna CASTRO and Wesley FLORIAN in the call center     Call back person was Maicol Zafar - she is Pt caretaker and medical person (her  is Pt POA and they take care of Pt fulltime) - her call back Ph # is [phone] - she ONLY wants that Ph # on file and not Pt s - do NOT call Pt number as he cannot hear and they take care of Pt    She wants clinic to call her back to confirm they rec d Pt hearing aids that they mailed back to clinic as Pt did not like his new hearing aids - Maicol said that the Audiologist already knows about Pt not liking the hearing aids    She wants to be sure that Pt Appt that clinic called her about is cancelled on June 22nd as Pt does NOT want to come into our clinic due to COVID and they do NOT want us to schedule any further Appts for this Pt and they will call back themselves if they want to schedule (also she was upset Pt was scheduled with an Audiologist they did not see before - but either way not coming to that Appt)    Maicol was upset that someone scheduled this without their knowledge - she wants call back to confirm Appt cancelled and hearing aids rec d and that Pt will be credited back and not billed for the hearing aids      Action Taken: Message routed to:  [facility] ENT    Travel Screening: Not Applicable                                                                         Detail Level: Detailed Otc Regimen: Caladryl or \\nNeosporin pain apply to lesions to help dry out faster Plan: Call in June to see if shingles vaccine is available

## 2020-06-16 NOTE — TELEPHONE ENCOUNTER
[facility] Call Center    Phone Message    May a detailed message be left on voicemail: yes     Reason for Call: patient caregiver Maicol called and they do not want to set up an appt for hearing aids they actually sent back the hearing aids provided and want to keep his old ones until they can figure out a different route     Also wanting to make sure that we received the hearing aids back in clinic       Action Taken: Message routed to:  [facility] AUDIO    Travel Screening: Not Applicable

## 2020-11-03 ENCOUNTER — OFFICE VISIT (OUTPATIENT)
Dept: AUDIOLOGY | Facility: CLINIC | Age: 85
End: 2020-11-03
Payer: MEDICARE

## 2020-11-03 DIAGNOSIS — H90.3 SENSORY HEARING LOSS, BILATERAL: Primary | ICD-10-CM

## 2020-11-03 PROCEDURE — V5014 HEARING AID REPAIR/MODIFYING: HCPCS | Mod: GY | Performed by: AUDIOLOGIST

## 2020-11-08 ENCOUNTER — HEALTH MAINTENANCE LETTER (OUTPATIENT)
Age: 85
End: 2020-11-08

## 2021-03-28 ENCOUNTER — HEALTH MAINTENANCE LETTER (OUTPATIENT)
Age: 86
End: 2021-03-28

## 2021-09-11 ENCOUNTER — HEALTH MAINTENANCE LETTER (OUTPATIENT)
Age: 86
End: 2021-09-11

## 2022-04-23 ENCOUNTER — HEALTH MAINTENANCE LETTER (OUTPATIENT)
Age: 87
End: 2022-04-23

## 2022-10-30 ENCOUNTER — HEALTH MAINTENANCE LETTER (OUTPATIENT)
Age: 87
End: 2022-10-30

## 2022-11-28 ENCOUNTER — OFFICE VISIT (OUTPATIENT)
Dept: AUDIOLOGY | Facility: CLINIC | Age: 87
End: 2022-11-28
Payer: MEDICARE

## 2022-11-28 ENCOUNTER — TELEPHONE (OUTPATIENT)
Dept: AUDIOLOGY | Facility: CLINIC | Age: 87
End: 2022-11-28

## 2022-11-28 DIAGNOSIS — H90.3 SENSORINEURAL HEARING LOSS (SNHL) OF BOTH EARS: Primary | ICD-10-CM

## 2022-11-28 PROCEDURE — 99207 PR ASSESSMENT FOR HEARING AID: CPT | Performed by: AUDIOLOGIST

## 2022-11-28 NOTE — PROGRESS NOTES
AUDIOLOGY REPORT    SUBJECTIVE:Raj Ruiz is a 89 year old male who was seen in the Audiology Clinic at the [facility] on 11/28/2022  for a hearing evaluation and hearing aid consultation. Previous results have revealed moderately-severe to severe sensorineural hearing loss bilaterally. The patient was fit with binaural Phonak Virto Q90 hearing aids fit at an outside clinic in 2014. He previously tried a different in-the-ear style hearing aids in 2020, but had difficulty with the battery compartment. He then returned these devices. Today, gS is wondering if there is any new technology available. He was accompanied by a family member, Maicol.      OBJECTIVE: Upon arrival, patient was notified that there was not an order for his hearing test. Attempts were made to contact the patient prior to the appointment, but were unsuccessful. We discussed that without an order, patient's insurance may not cover the cost of testing. We discussed that patient can continue with testing if desired and sign an ABN stating he understands the cost may be not be covered. Patient elected to wait for the hearing test and return once an order was received.   We discussed that we would want to have an updated hearing test prior to completing the hearing aid consultation. Maicol attempted to contact Sg's primary physician's office while in clinic today to see if an order could be faxed. She was told that patient would need be seen by provider prior to them sending an order. A phone call appointment with his primary care provider was set up for December 16th.     It was discussed that patient will return for the hearing test and hearing aid consultation following this appointment when an order is obtained. We briefly discussed updates in hearing aid technology including rechargeable batteries and bluetooth options, however a full consultation will be completed after the hearing test.  Questions regarding connectivity options were answered. This included a [employer] streamer which would be an out of pocket expense of $390. Patient was also notified that there was no coverage for hearing aids through his insurance. He expressed understanding and agreement.     Otoscopy revealed clear ear canals bilaterally.    No charge visit today as no billable services were provided.     ASSESSMENT: A hearing evaluation and hearing aid consultation were not completed today as patient would like to obtain an order first.     PLAN: Raj will return for a hearing evaluation and hearing aid consultation after receiving an order from his primary care physician. He was assisted with scheduling these appointments today. Please call this clinic with any questions regarding today s appointment.    Allison Rivero M.A.  Audiology Doctoral Extern  [place] #[id number]    I was present with the patient for the entire Audiology appointment including all procedures/testing performed by the AuD student, and agree with the student s assessment and plan as documented.      Sanjuanita Hanna. CCC-A  Licensed Audiologist   [place] #[id number]

## 2022-11-28 NOTE — TELEPHONE ENCOUNTER
Attempted to contact patient's caregiver regarding no order for appointment today. However, call went to voicemail. Voicemail is full and was unable to leave message.     Sanjuanita Hanna. CCC-A  Licensed Audiologist   [place] #[id number]

## 2022-12-16 ENCOUNTER — MEDICAL CORRESPONDENCE (OUTPATIENT)
Dept: HEALTH INFORMATION MANAGEMENT | Facility: CLINIC | Age: 87
End: 2022-12-16

## 2022-12-26 ENCOUNTER — TRANSCRIBE ORDERS (OUTPATIENT)
Dept: OTHER | Age: 87
End: 2022-12-26

## 2022-12-26 DIAGNOSIS — H91.90 HEARING LOSS, UNSPECIFIED HEARING LOSS TYPE, UNSPECIFIED LATERALITY: Primary | ICD-10-CM

## 2023-01-16 ENCOUNTER — OFFICE VISIT (OUTPATIENT)
Dept: AUDIOLOGY | Facility: CLINIC | Age: 88
End: 2023-01-16
Payer: MEDICARE

## 2023-01-16 DIAGNOSIS — H90.3 SENSORINEURAL HEARING LOSS, BILATERAL: Primary | ICD-10-CM

## 2023-01-16 DIAGNOSIS — H91.90 HEARING LOSS, UNSPECIFIED HEARING LOSS TYPE, UNSPECIFIED LATERALITY: ICD-10-CM

## 2023-01-16 PROCEDURE — 92591 PR HEARING AID EXAM BINAURAL: CPT | Performed by: AUDIOLOGIST

## 2023-01-16 PROCEDURE — 92567 TYMPANOMETRY: CPT | Mod: 52 | Performed by: AUDIOLOGIST

## 2023-01-16 PROCEDURE — 92557 COMPREHENSIVE HEARING TEST: CPT | Performed by: AUDIOLOGIST

## 2023-01-16 NOTE — PROGRESS NOTES
AUDIOLOGY REPORT    SUBJECTIVE:  Raj Ruiz is a 89 year old male who was seen in the Audiology Clinic at the [facility]  for audiologic evaluation and hearing aid consultation, referred by Vinh Sotelo MD. Previous results have revealed moderately-severe to severe sensorineural hearing loss bilaterally. The patient was fit with binaural Phonak Virto Q90 hearing aids fit at an outside clinic in 2014. He previously tried a different in-the-ear style hearing aids in 2020, but had difficulty with the battery compartment. He then returned these devices. He is interested in new technology. The patient denies  bilateral tinnitus, bilateral otalgia, bilateral drainage, bilateral aural fullness and dizziness.  The patient notes difficulty with communication in a variety of listening situations.  They were accompanied today by their friend, Albino.    OBJECTIVE:    Fall Risk Screen:  1. Have you fallen two or more times in the past year? No  2. Have you fallen and had an injury in the past year? No    Timed Up and Go Score (in seconds): not tested  Is patient a fall risk? No  Referral initiated: No  Fall Risk Assessment Completed by Audiology    Otoscopic exam indicates ears are clear of cerumen bilaterally     Pure Tone Thresholds assessed using conventional audiometry with good  reliability from 250-8000 Hz bilaterally using insert earphones and circumaural headphones     RIGHT:  moderately-severe to severe sensorineural hearing loss    LEFT:    moderately-severe to severe sensorineural hearing loss    Tympanogram:    RIGHT: normal eardrum mobility    LEFT:   Could not seal    Speech Reception Threshold:    RIGHT: 75 dB HL    LEFT:   75 dB HL    Word Recognition Score:     RIGHT: 72% at 100 dB HL using NU-6 recorded word list.    LEFT:   40% at 100 dB HL using NU-6 recorded word list.    Patient is a hearing aid candidate. Patient would like to move forward with a  hearing aid evaluation today. Therefore, the patient was presented with different options for amplification to help aid in communication. Discussed styles, levels of technology and monaural vs. binaural fitting. Given his previous issues with changing the battery, rechargeable devices would likely be beneficial to Sg. He is not interested in streaming. He reports that he would like to be able to hear the TV better and hear small group conversations more easily.     The hearing aid(s) mutually chosen were:  Binaural: Xunlei Evolv AI 1600 Full-Shell  COLOR: Pink  BATTERY SIZE: rechargeable  EARMOLD/TIPS: not applicable    Otoscopy revealed ears are clear of cerumen bilaterally. Bilateral earmolds were taken without incident.    Realistic expectations were reviewed given the speech understanding in the left ear. He expressed understanding. He may be interested in a TV connector in the future if new hearing aids do not improve his understanding of the TV enough. He was quoted $390 for that and will decide after he tries the new hearing aids if it is something he would like.      ASSESSMENT:     Compared to patient's previous audiogram dated 2/12/2020, hearing thresholds have remained stable but speech understanding in the left ear has decreased. Today s results were discussed with the patient in detail.     Reviewed purchase information and warranty information with patient. The 45 day trial period was explained to patient. The patient was given a copy of the [place] Department of [employer] consumer brochure on purchasing hearing instruments. Patient risk factors have been provided to the patient in writing prior to the sale of the hearing aid per FDA regulation. The risk factors are also available in the User Instructional Booklet to be presented on the day of the hearing aid fitting. Hearing aid(s) ordered. Hearing aid evaluation completed.    PLAN:  Patient was counseled regarding hearing loss and impact on  communication.  Patient is a good candidate for amplification at this time.  Handout on good communication strategies, and hearing aid use was given to patient. It is recommended that the patient continue to monitor his hearing status every 1-2 years, sooner if concerns arise.  Raj is scheduled to return in 2-3 weeks for a hearing aid fitting and programming. Purchase agreement will be completed on that date. Please contact this clinic with any questions or concerns.        Olive Thomas  Audiologist  [place] License  #[id number]

## 2023-02-15 ENCOUNTER — OFFICE VISIT (OUTPATIENT)
Dept: AUDIOLOGY | Facility: CLINIC | Age: 88
End: 2023-02-15

## 2023-02-15 DIAGNOSIS — H90.3 SENSORINEURAL HEARING LOSS, BILATERAL: Primary | ICD-10-CM

## 2023-02-15 PROCEDURE — V5011 HEARING AID FITTING/CHECKING: HCPCS | Performed by: AUDIOLOGIST

## 2023-02-15 PROCEDURE — V5020 CONFORMITY EVALUATION: HCPCS | Performed by: AUDIOLOGIST

## 2023-02-15 PROCEDURE — V5260 HEARING AID, DIGIT, BIN, ITE: HCPCS | Performed by: AUDIOLOGIST

## 2023-02-15 PROCEDURE — V5160 DISPENSING FEE BINAURAL: HCPCS | Performed by: AUDIOLOGIST

## 2023-02-15 NOTE — PROGRESS NOTES
AUDIOLOGY REPORT    SUBJECTIVE: Raj Ruiz is a 89 year old male who was seen in the Audiology Clinic at the [facility] for a fitting of bilateral Demetrio Evolv AI 1600 Full-shell hearing aids. Previous results have revealed a bilateral sensorineural hearing loss. The patient is an experienced user of hearing aids. He was accompanied today by his friend, Albino.    OBJECTIVE: The hearing aid conformity evaluation was completed.The hearing aids were placed and they provided a good fit. Simulated real-ear measurements were completed on the [employer] system and were a good match to NAL-NL1 target with soft sounds audible, moderate sounds comfortable, and loud sounds below discomfort. UCLs are verified through maximum power output measures and demonstrate appropriate limiting of loud inputs. Raj was oriented to proper hearing aid use, care, cleaning (no water, dry brush), batteries (size: BATTERY SIZE: rechargeable, insertion/removal, toxicity, low-battery signal), aid insertion/removal, user booklet, warranty information, storage cases, and other hearing aid details. The patient confirmed understanding of hearing aid use and care, and showed proper insertion of hearing aid and batteries while in the office today.Raj reported good volume and sound quality today.   Hearing aids were programmed as follows:  Program 1:Normal  All buttons/features are deactivated per patient request as he is afraid of turning them off or changing settings accidentally.     EAR(S) FIT: Bilateral  HEARING AID MODEL NAME: Demetrio Evolv AI 1600  HEARING AID STYLE: Full Shell in-the-ear  EARMOLDS/TIP: not applicable  SERIAL NUMBERS: Right: [id number] Left: [id number]  WARRANTY END DATE: 2/18/2026    He does not have a smartphone and so the devices were not paired with any accessories.    ASSESSMENT: Bilateral hearing aids were fit today. Verification measures were performed. Raj  signed the Hearing Aid Purchase Agreement and was given a copy, as well as details on his hearing aids. Patient was counseled that exact out of pocket amounts cannot be determined for hearing aid claims being sent to insurance. Any insurance coverage information presented to the patient is an estimate only, and is not a guarantee of payment. Patient has been advised to check with their own insurance.    PLAN:Raj will return for follow-up in 2-3 weeks for a hearing aid review appointment. He will contact the clinic should he desire to order a TV streamer prior to the next appointment. Please call this clinic with questions regarding today s appointment.    Olive Thomas  Audiologist  [place] License  #[id number]

## 2023-03-08 ENCOUNTER — OFFICE VISIT (OUTPATIENT)
Dept: AUDIOLOGY | Facility: CLINIC | Age: 88
End: 2023-03-08
Payer: MEDICARE

## 2023-03-08 DIAGNOSIS — H90.3 SENSORINEURAL HEARING LOSS, BILATERAL: Primary | ICD-10-CM

## 2023-03-08 PROCEDURE — 99207 PR ASSESSMENT FOR HEARING AID: CPT | Performed by: AUDIOLOGIST

## 2023-03-08 NOTE — PROGRESS NOTES
"AUDIOLOGY REPORT    SUBJECTIVE:Raj Ruiz is a 89 year old male who was seen in the Audiology Clinic at the [facility] on 3/8/2023  for a follow-up check regarding the fitting of new hearing aids. Previous results have revealed a bilateral sensorineural hearing loss with the right word understanding better than the left word understanding.  The patient has been seen previously in this clinic and was fit with bilateral [employer] Evolv AI 1600 full-shell hearing aids on 2/15/2023.  Raj reports that he is overall pleased with the hearing aids and sound quality. He does note that when he goes to put it in the , the hearing aid will say \"telephone\". He was accompanied by his friend, Albino, to today's appointment. Albino reports that he has noticed improved hearing with the new hearing aids.    OBJECTIVE:     Based on patient report, the following changes were made; The \"telephone\" was replicated in office. It is likely caused by the Autophone feature. This was disabled and the problem seemed to resolve. No other programming changes were made.    Albino had questions about experiencing some one-offs for feedback and when Sg wasn't sure if he could hear with the device, but it appeared to be on. Otoscopy revealed a little non-occluding cerumen in the left ear which is likely a contributing factor. This was reviewed with the patient who expressed understanding.    Lastly, he feels that he is hearing the TV better with the new hearing aids and does not feel that  TV streamer is needed at this time.    Reviewed 45 day trial period, care, cleaning (no water, dry brush), batteries (rechargeable) insertion/removal, toxicity, low-battery signal), aid insertion/removal, volume adjustment (if applicable), user booklet, warranty information, storage cases, and other hearing aid details.     No charge visit today (in warranty hearing aid check).     ASSESSMENT: A " follow-up appointment for hearing aid fitting was completed today.  Changes to hearing aid was completed as outlined above.     PLAN:Raj will return for follow-up as needed, or at least every 9-12 months for cleaning and assessment of hearing aid.  . Please call this clinic with any questions regarding today s appointment.    Olive Thomas  Audiologist  [place] License  #[id number]

## 2023-05-10 ENCOUNTER — APPOINTMENT (OUTPATIENT)
Dept: URBAN - METROPOLITAN AREA CLINIC 256 | Age: 88
Setting detail: DERMATOLOGY
End: 2023-05-11

## 2023-05-10 VITALS — WEIGHT: 145 LBS | HEIGHT: 64 IN

## 2023-05-10 DIAGNOSIS — B35.1 TINEA UNGUIUM: ICD-10-CM

## 2023-05-10 DIAGNOSIS — L57.8 OTHER SKIN CHANGES DUE TO CHRONIC EXPOSURE TO NONIONIZING RADIATION: ICD-10-CM

## 2023-05-10 DIAGNOSIS — Z71.89 OTHER SPECIFIED COUNSELING: ICD-10-CM

## 2023-05-10 DIAGNOSIS — B35.3 TINEA PEDIS: ICD-10-CM

## 2023-05-10 DIAGNOSIS — D18.0 HEMANGIOMA: ICD-10-CM

## 2023-05-10 DIAGNOSIS — D22 MELANOCYTIC NEVI: ICD-10-CM

## 2023-05-10 DIAGNOSIS — L82.1 OTHER SEBORRHEIC KERATOSIS: ICD-10-CM

## 2023-05-10 DIAGNOSIS — L89 PRESSURE ULCER: ICD-10-CM

## 2023-05-10 DIAGNOSIS — L57.0 ACTINIC KERATOSIS: ICD-10-CM

## 2023-05-10 PROBLEM — D22.5 MELANOCYTIC NEVI OF TRUNK: Status: ACTIVE | Noted: 2023-05-10

## 2023-05-10 PROBLEM — L89.159 PRESSURE ULCER OF SACRAL REGION, UNSPECIFIED STAGE: Status: ACTIVE | Noted: 2023-05-10

## 2023-05-10 PROBLEM — D18.01 HEMANGIOMA OF SKIN AND SUBCUTANEOUS TISSUE: Status: ACTIVE | Noted: 2023-05-10

## 2023-05-10 PROCEDURE — 17003 DESTRUCT PREMALG LES 2-14: CPT

## 2023-05-10 PROCEDURE — OTHER COUNSELING: OTHER

## 2023-05-10 PROCEDURE — 17000 DESTRUCT PREMALG LESION: CPT

## 2023-05-10 PROCEDURE — 99213 OFFICE O/P EST LOW 20 MIN: CPT | Mod: 25

## 2023-05-10 PROCEDURE — OTHER LIQUID NITROGEN: OTHER

## 2023-05-10 PROCEDURE — OTHER EDUCATIONAL RESOURCES PROVIDED: OTHER

## 2023-05-10 PROCEDURE — OTHER MIPS QUALITY: OTHER

## 2023-05-10 ASSESSMENT — LOCATION DETAILED DESCRIPTION DERM
LOCATION DETAILED: RIGHT SUPERIOR FOREHEAD
LOCATION DETAILED: RIGHT INFERIOR UPPER BACK
LOCATION DETAILED: LEFT 2ND TOENAIL
LOCATION DETAILED: LEFT 3RD TOENAIL
LOCATION DETAILED: RIGHT DORSAL FOOT
LOCATION DETAILED: RIGHT 3RD TOENAIL
LOCATION DETAILED: LEFT SUPERIOR PARIETAL SCALP
LOCATION DETAILED: LEFT GREAT TOENAIL
LOCATION DETAILED: RIGHT 4TH TOENAIL
LOCATION DETAILED: LEFT 4TH TOENAIL
LOCATION DETAILED: RIGHT 5TH TOENAIL
LOCATION DETAILED: RIGHT 2ND TOENAIL
LOCATION DETAILED: RIGHT SUPERIOR MEDIAL MIDBACK
LOCATION DETAILED: LEFT DORSAL FOOT
LOCATION DETAILED: GLUTEAL CLEFT
LOCATION DETAILED: LEFT MEDIAL MALAR CHEEK
LOCATION DETAILED: RIGHT GREAT TOENAIL
LOCATION DETAILED: RIGHT INFERIOR MEDIAL MIDBACK
LOCATION DETAILED: LEFT 5TH TOENAIL

## 2023-05-10 ASSESSMENT — LOCATION ZONE DERM
LOCATION ZONE: TRUNK
LOCATION ZONE: FEET
LOCATION ZONE: FACE
LOCATION ZONE: TOENAIL
LOCATION ZONE: SCALP

## 2023-05-10 ASSESSMENT — LOCATION SIMPLE DESCRIPTION DERM
LOCATION SIMPLE: RIGHT LOWER BACK
LOCATION SIMPLE: LEFT CHEEK
LOCATION SIMPLE: LEFT 3RD TOE
LOCATION SIMPLE: RIGHT 3RD TOE
LOCATION SIMPLE: LEFT FOOT
LOCATION SIMPLE: RIGHT FOREHEAD
LOCATION SIMPLE: RIGHT 4TH TOE
LOCATION SIMPLE: GLUTEAL CLEFT
LOCATION SIMPLE: RIGHT 2ND TOE
LOCATION SIMPLE: RIGHT FOOT
LOCATION SIMPLE: RIGHT UPPER BACK
LOCATION SIMPLE: LEFT 2ND TOE
LOCATION SIMPLE: LEFT 4TH TOE
LOCATION SIMPLE: LEFT GREAT TOE
LOCATION SIMPLE: LEFT 5TH TOE
LOCATION SIMPLE: RIGHT 5TH TOE
LOCATION SIMPLE: RIGHT GREAT TOE
LOCATION SIMPLE: SCALP

## 2023-05-10 NOTE — PROCEDURE: MIPS QUALITY
Quality 110: Preventive Care And Screening: Influenza Immunization: Influenza Immunization Administered during Influenza season
Quality 47: Advance Care Plan: Advance Care Planning discussed and documented in the medical record; patient did not wish or was not able to name a surrogate decision maker or provide an advance care plan.
Quality 226: Preventive Care And Screening: Tobacco Use: Screening And Cessation Intervention: Patient screened for tobacco use and is an ex/non-smoker
Quality 431: Preventive Care And Screening: Unhealthy Alcohol Use - Screening: Patient not identified as an unhealthy alcohol user when screened for unhealthy alcohol use using a systematic screening method
Quality 130: Documentation Of Current Medications In The Medical Record: Current Medications Documented
Quality 111:Pneumonia Vaccination Status For Older Adults: Patient received any pneumococcal conjugate or polysaccharide vaccine on or after their 60th birthday and before the end of the measurement period
Detail Level: Detailed

## 2023-05-10 NOTE — PROCEDURE: LIQUID NITROGEN
Render Note In Bullet Format When Appropriate: No
Post-Care Instructions: I reviewed with the patient in detail post-care instructions. Patient is to wear sunprotection, and avoid picking at any of the treated lesions. Pt may apply Vaseline to crusted or scabbing areas.
Number Of Freeze-Thaw Cycles: 1 freeze-thaw cycle
Show Aperture Variable?: Yes
Application Tool (Optional): Liquid Nitrogen Sprayer
Detail Level: Detailed
Duration Of Freeze Thaw-Cycle (Seconds): 5
Consent: The patient's consent was obtained including but not limited to risks of crusting, scabbing, blistering, scarring, darker or lighter pigmentary change, recurrence, incomplete removal and infection.

## 2023-05-10 NOTE — HPI: FULL BODY SKIN EXAMINATION
What Is The Reason For Today's Visit?: Full Body Skin Examination
What Is The Reason For Today's Visit? (Being Monitored For X): concerning skin lesions on an annual basis
Additional History: Patient complains of an area of concern on the lower spine that has been present for about 8 months. He states he has been applying vaseline to the area and that it is rough. He has concerns that it may be due from sitting. He is here for evaluation and management.

## 2023-06-01 ENCOUNTER — HEALTH MAINTENANCE LETTER (OUTPATIENT)
Age: 88
End: 2023-06-01

## 2024-02-19 ENCOUNTER — TELEPHONE (OUTPATIENT)
Dept: AUDIOLOGY | Facility: CLINIC | Age: 89
End: 2024-02-19
Payer: MEDICARE

## 2024-02-19 NOTE — TELEPHONE ENCOUNTER
Walk-in hearing aid services on 2/19/24: The patient's friend, Maicol Carrera, dropped off both of his hearing aids.  It was stated the patient accidentally placed both hearing aids in his pocket where a chocolate bar had started to melt.  When the patient pulled the hearing aids out he found they weren't working.  Both hearing aids were deep cleaned and all filters (julieth and sound bore) were replaced.  Afterwards a listening check found both hearing aids to be working properly.  Maicol was contacted via phone and told the hearing aids could be picked up at the clinic.

## 2024-05-22 ENCOUNTER — APPOINTMENT (OUTPATIENT)
Dept: URBAN - METROPOLITAN AREA CLINIC 256 | Age: 89
Setting detail: DERMATOLOGY
End: 2024-05-22

## 2024-05-22 DIAGNOSIS — Z71.89 OTHER SPECIFIED COUNSELING: ICD-10-CM

## 2024-05-22 DIAGNOSIS — L72.8 OTHER FOLLICULAR CYSTS OF THE SKIN AND SUBCUTANEOUS TISSUE: ICD-10-CM

## 2024-05-22 DIAGNOSIS — D18.0 HEMANGIOMA: ICD-10-CM

## 2024-05-22 DIAGNOSIS — D22 MELANOCYTIC NEVI: ICD-10-CM

## 2024-05-22 DIAGNOSIS — L82.1 OTHER SEBORRHEIC KERATOSIS: ICD-10-CM

## 2024-05-22 DIAGNOSIS — L57.8 OTHER SKIN CHANGES DUE TO CHRONIC EXPOSURE TO NONIONIZING RADIATION: ICD-10-CM

## 2024-05-22 DIAGNOSIS — L57.0 ACTINIC KERATOSIS: ICD-10-CM

## 2024-05-22 DIAGNOSIS — L89 PRESSURE ULCER: ICD-10-CM

## 2024-05-22 PROBLEM — D22.39 MELANOCYTIC NEVI OF OTHER PARTS OF FACE: Status: ACTIVE | Noted: 2024-05-22

## 2024-05-22 PROBLEM — D22.5 MELANOCYTIC NEVI OF TRUNK: Status: ACTIVE | Noted: 2024-05-22

## 2024-05-22 PROBLEM — D22.61 MELANOCYTIC NEVI OF RIGHT UPPER LIMB, INCLUDING SHOULDER: Status: ACTIVE | Noted: 2024-05-22

## 2024-05-22 PROBLEM — L89.899 PRESSURE ULCER OF OTHER SITE, UNSPECIFIED STAGE: Status: ACTIVE | Noted: 2024-05-22

## 2024-05-22 PROBLEM — D22.62 MELANOCYTIC NEVI OF LEFT UPPER LIMB, INCLUDING SHOULDER: Status: ACTIVE | Noted: 2024-05-22

## 2024-05-22 PROBLEM — D18.01 HEMANGIOMA OF SKIN AND SUBCUTANEOUS TISSUE: Status: ACTIVE | Noted: 2024-05-22

## 2024-05-22 PROCEDURE — 17000 DESTRUCT PREMALG LESION: CPT

## 2024-05-22 PROCEDURE — 99213 OFFICE O/P EST LOW 20 MIN: CPT | Mod: 25

## 2024-05-22 PROCEDURE — OTHER LIQUID NITROGEN: OTHER

## 2024-05-22 PROCEDURE — OTHER PATIENT SPECIFIC COUNSELING: OTHER

## 2024-05-22 PROCEDURE — 17003 DESTRUCT PREMALG LES 2-14: CPT

## 2024-05-22 PROCEDURE — OTHER COUNSELING: OTHER

## 2024-05-22 PROCEDURE — OTHER MIPS QUALITY: OTHER

## 2024-05-22 ASSESSMENT — LOCATION DETAILED DESCRIPTION DERM
LOCATION DETAILED: LEFT VENTRAL PROXIMAL FOREARM
LOCATION DETAILED: RIGHT INFERIOR CENTRAL MALAR CHEEK
LOCATION DETAILED: LEFT INFERIOR CENTRAL MALAR CHEEK
LOCATION DETAILED: SUPERIOR THORACIC SPINE
LOCATION DETAILED: RIGHT FOREHEAD
LOCATION DETAILED: RIGHT SUPERIOR PARIETAL SCALP
LOCATION DETAILED: LEFT CENTRAL MALAR CHEEK
LOCATION DETAILED: RIGHT VENTRAL PROXIMAL FOREARM
LOCATION DETAILED: RIGHT SUPERIOR MEDIAL FOREHEAD
LOCATION DETAILED: LEFT SUPERIOR MEDIAL UPPER BACK
LOCATION DETAILED: STERNUM
LOCATION DETAILED: LEFT ANTECUBITAL SKIN
LOCATION DETAILED: LEFT INFERIOR LATERAL MALAR CHEEK

## 2024-05-22 ASSESSMENT — LOCATION SIMPLE DESCRIPTION DERM
LOCATION SIMPLE: SCALP
LOCATION SIMPLE: LEFT UPPER BACK
LOCATION SIMPLE: LEFT UPPER ARM
LOCATION SIMPLE: CHEST
LOCATION SIMPLE: UPPER BACK
LOCATION SIMPLE: RIGHT CHEEK
LOCATION SIMPLE: RIGHT FOREHEAD
LOCATION SIMPLE: LEFT FOREARM
LOCATION SIMPLE: RIGHT FOREARM
LOCATION SIMPLE: LEFT CHEEK

## 2024-05-22 ASSESSMENT — LOCATION ZONE DERM
LOCATION ZONE: SCALP
LOCATION ZONE: FACE
LOCATION ZONE: ARM
LOCATION ZONE: TRUNK

## 2024-05-22 NOTE — PROCEDURE: PATIENT SPECIFIC COUNSELING
Detail Level: Zone
Patient advised to leave this cyst alone due to exacerbation risks. Discussed complete removal would require surgery. Patient expressed understanding.

## 2024-05-22 NOTE — PROCEDURE: LIQUID NITROGEN
Show Applicator Variable?: Yes
Render Post-Care Instructions In Note?: no
Application Tool (Optional): Liquid Nitrogen Sprayer
Consent: The patient's consent was obtained including but not limited to risks of crusting, scabbing, blistering, scarring, darker or lighter pigmentary change, recurrence, incomplete removal and infection.
Duration Of Freeze Thaw-Cycle (Seconds): 5
Post-Care Instructions: I reviewed with the patient in detail post-care instructions. Patient is to wear sunprotection, and avoid picking at any of the treated lesions. Pt may apply Vaseline to crusted or scabbing areas.
Detail Level: Simple
Number Of Freeze-Thaw Cycles: 2 freeze-thaw cycles

## 2024-05-22 NOTE — PROCEDURE: COUNSELING
Detail Level: Zone
Detail Level: Detailed
Detail Level: Simple
Patient Specific Counseling (Will Not Stick From Patient To Patient): - recommended the patient purchase a donut cushion to alleviate pressure to the area

## 2024-06-09 ENCOUNTER — HEALTH MAINTENANCE LETTER (OUTPATIENT)
Age: 89
End: 2024-06-09

## 2024-07-30 ENCOUNTER — APPOINTMENT (OUTPATIENT)
Dept: URBAN - METROPOLITAN AREA CLINIC 256 | Age: 89
Setting detail: DERMATOLOGY
End: 2024-07-30

## 2024-07-30 VITALS — HEIGHT: 60 IN | WEIGHT: 145 LBS

## 2024-07-30 DIAGNOSIS — L0391 CELLULITIS AND ABSCESS OF UNSPECIFIED SITES: ICD-10-CM

## 2024-07-30 DIAGNOSIS — L72.0 EPIDERMAL CYST: ICD-10-CM

## 2024-07-30 DIAGNOSIS — L0390 CELLULITIS AND ABSCESS OF UNSPECIFIED SITES: ICD-10-CM

## 2024-07-30 PROBLEM — L02.213 CUTANEOUS ABSCESS OF CHEST WALL: Status: ACTIVE | Noted: 2024-07-30

## 2024-07-30 PROCEDURE — OTHER PATIENT SPECIFIC COUNSELING: OTHER

## 2024-07-30 PROCEDURE — OTHER COUNSELING: OTHER

## 2024-07-30 PROCEDURE — OTHER PHOTO-DOCUMENTATION: OTHER

## 2024-07-30 PROCEDURE — 99212 OFFICE O/P EST SF 10 MIN: CPT | Mod: 25

## 2024-07-30 PROCEDURE — OTHER INCISION AND DRAINAGE: OTHER

## 2024-07-30 PROCEDURE — OTHER MIPS QUALITY: OTHER

## 2024-07-30 PROCEDURE — 10060 I&D ABSCESS SIMPLE/SINGLE: CPT

## 2024-07-30 ASSESSMENT — LOCATION DETAILED DESCRIPTION DERM: LOCATION DETAILED: STERNUM

## 2024-07-30 ASSESSMENT — LOCATION SIMPLE DESCRIPTION DERM: LOCATION SIMPLE: CHEST

## 2024-07-30 ASSESSMENT — SEVERITY ASSESSMENT: SEVERITY: MODERATE TO SEVERE

## 2024-07-30 ASSESSMENT — LOCATION ZONE DERM: LOCATION ZONE: TRUNK

## 2024-07-30 NOTE — PROCEDURE: PATIENT SPECIFIC COUNSELING
Detail Level: Zone
-Informed the patient indeed he does have an infection \\n-To treat, discuss options of allowing more time for it to impact by itself vs I&D vs trial oral abx, pros and cons of each reviewed \\n-Patient elected for the I&D
-Discussed that following healing from the abscess, the cyst will likely remain\\n-Advised against picking at lesion

## 2024-07-30 NOTE — PROCEDURE: MIPS QUALITY
Quality 47: Advance Care Plan: Advance Care Planning discussed and documented; advance care plan or surrogate decision maker documented in the medical record.
Detail Level: Detailed
Quality 226: Preventive Care And Screening: Tobacco Use: Screening And Cessation Intervention: Patient screened for tobacco use and is an ex/non-smoker
Quality 130: Documentation Of Current Medications In The Medical Record: Current Medications Documented
Quality 431: Preventive Care And Screening: Unhealthy Alcohol Use - Screening: Patient not screened for unhealthy alcohol use using a systematic screening method

## 2024-07-30 NOTE — PROCEDURE: INCISION AND DRAINAGE
Detail Level: Detailed
Lesion Type: Abscess
Method: 11 blade
Curette: No
Anesthesia Type: 1% lidocaine with epinephrine
Anesthesia Volume In Cc: 0.5
Drainage Amount?: moderate
Drainage Type?: bloody and cyst-like
Wound Care: Petrolatum
Dressing: dry sterile dressing
Epidermal Sutures: 4-0 Ethilon
Epidermal Closure: simple interrupted
Suture Text: The incision was partially closed with
Preparation Text: The area was prepped in the usual clean fashion.
Curette Text (Optional): After the contents were expressed a curette was used to partially remove the cyst wall.
Consent was obtained and risks were reviewed including but not limited to delayed wound healing, infection, need for multiple I and D's, and pain.
Post-Care Instructions: I reviewed with the patient in detail post-care instructions. Patient should keep wound covered and call the office should any redness, pain, swelling or worsening occur.

## 2024-09-10 ENCOUNTER — OFFICE VISIT (OUTPATIENT)
Dept: ORTHOPEDICS | Facility: CLINIC | Age: 89
End: 2024-09-10
Payer: MEDICARE

## 2024-09-10 ENCOUNTER — PRE VISIT (OUTPATIENT)
Dept: ORTHOPEDICS | Facility: CLINIC | Age: 89
End: 2024-09-10

## 2024-09-10 ENCOUNTER — ANCILLARY PROCEDURE (OUTPATIENT)
Dept: GENERAL RADIOLOGY | Facility: CLINIC | Age: 89
End: 2024-09-10
Attending: FAMILY MEDICINE
Payer: MEDICARE

## 2024-09-10 DIAGNOSIS — M25.561 ACUTE PAIN OF RIGHT KNEE: Primary | ICD-10-CM

## 2024-09-10 DIAGNOSIS — M25.561 RIGHT KNEE PAIN: Primary | ICD-10-CM

## 2024-09-10 PROCEDURE — 73562 X-RAY EXAM OF KNEE 3: CPT | Mod: RT | Performed by: RADIOLOGY

## 2024-09-10 PROCEDURE — 99204 OFFICE O/P NEW MOD 45 MIN: CPT | Performed by: FAMILY MEDICINE

## 2024-09-10 NOTE — TELEPHONE ENCOUNTER
DIAGNOSIS: right knee, pain & swelling/ self ref/ no imaging, no prior surgery/ Medicare & BCBS     APPOINTMENT DATE: 9.10.24   NOTES STATUS DETAILS   OFFICE NOTE from other specialist CE/Internal 9.28.15  [facility]    8.18.15, 7.14.15, 6.6.15  [facility]    7.20.15 + more  PT   MEDICATION LIST Internal    CT SCAN Internal 8.20.15  CT Knee Right   XRAYS (IMAGES & REPORTS) Internal 6.16.15  XR Knee Right  XR Knee AP Standing Edmundo

## 2024-09-10 NOTE — LETTER
9/10/2024      RE: Raj Ruiz  [address] [address]     Dear Colleague,    Thank you for referring your patient, Raj Ruiz, to the [facility] [place]. Please see a copy of my visit note below.    CHIEF COMPLAINT:  Pain of the Right Knee       HISTORY OF PRESENT ILLNESS  Mr. Ruiz is a 89 year old male who presents to clinic today with right knee pain.  Sg has been experiencing pain in his right knee for the past few weeks. He felt an instantaneous pull at the outside of his knee and leg while attempting to pivot his wheelchair while seated.  This has improved somewhat, although his pain is still present.  He points to the medial aspect of his knee, he did have a large amount of swelling initially.  The swelling has nearly resolved, but is still present to some degree.        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        Cholecalciferol (VITAMIN D) 1000 UNITS capsule Take 1 capsule by mouth daily       cyanocobalamin 1000 MCG SUBL Place 1,000 mg under the tongue daily       erythromycin (ROMYCIN) 5 MG/GM ophthalmic ointment Place 0.5 inches Into the left eye 6 times daily 3.5 g 0     latanoprost (XALATAN) 0.005 % ophthalmic solution        megestrol (MEGACE) 20 MG tablet Take 20 mg by mouth daily       Misc Natural Products (LUTEIN VISION BLEND) CAPS Take 2 capsules by mouth daily preservision brand       Omega-3 Fatty Acids (FISH OIL PO)        omeprazole (PRILOSEC) 20 MG capsule        psyllium (METAMUCIL) WAFR Take 2 Wafers by mouth daily       warfarin (COUMADIN) 2.5 MG tablet Take 2.5 mg by mouth daily DAILY FOR NOW         Allergies   Allergen Reactions     Penicillins        Family History   Problem Relation Age of Onset     Cerebrovascular Disease Mother      Heart Disease Father        Additional medical/Social/Surgical histories reviewed in EPIC and updated as appropriate.        PHYSICAL EXAM  General  - normal appearance, in no obvious distress  Musculoskeletal - right knee  - stance: mildly antalgic gait  - inspection: trace soft tissue swelling at medial compartment  - palpation: No significant joint line tenderness  - ROM: 120 degrees flexion, 0 degrees extension  - strength: 5/5 in flexion, 5/5 in extension  - special tests:  (-) Carla  (-) varus at 0 and 30 degrees flexion  (-) valgus at 0 and 30 degrees flexion  Neuro  - no sensory or motor deficit, grossly normal coordination, normal muscle tone              ASSESSMENT & PLAN  Mr. Ruiz is a 89 year old male who presents to clinic today with right knee pain.    I ordered and independently reviewed an xray of his right knee, this reveals minimal osteoarthritis.    His symptoms very well may be secondary to a muscular strain or intraarticular soft tissue injury.  Fortunately his symptoms are resolving.    We did discuss diclofenac gel and offloading, both of  which I think will help him quite a bit.  I also encouraged Sg to be active as able, which he is doing.    If his symptoms are not improving whatsoever in the coming weeks we could consider an intra-articular knee injection.  If he does well we can follow-up as need be for this and other issues.    It was a pleasure seeing Raj today.    Robert Jim DO, [facility]  Primary Care Sports Medicine      This note was constructed using Dragon dictation software, please excuse any minor errors in spelling, grammar, or syntax.      Again, thank you for allowing me to participate in the care of your patient.      Sincerely,    Robert Jim DO

## 2024-09-10 NOTE — PROGRESS NOTES
CHIEF COMPLAINT:  Pain of the Right Knee       HISTORY OF PRESENT ILLNESS  Mr. Ruiz is a 89 year old male who presents to clinic today with right knee pain.  Sg has been experiencing pain in his right knee for the past few weeks. He felt an instantaneous pull at the outside of his knee and leg while attempting to pivot his wheelchair while seated.  This has improved somewhat, although his pain is still present.  He points to the medial aspect of his knee, he did have a large amount of swelling initially.  The swelling has nearly resolved, but is still present to some degree.        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      Cholecalciferol (VITAMIN D) 1000 UNITS capsule Take 1 capsule by mouth daily      cyanocobalamin 1000 MCG SUBL Place 1,000 mg under the tongue daily      erythromycin (ROMYCIN) 5 MG/GM ophthalmic ointment Place 0.5 inches Into the left eye 6 times daily 3.5 g 0    latanoprost (XALATAN) 0.005 % ophthalmic  solution       megestrol (MEGACE) 20 MG tablet Take 20 mg by mouth daily      Misc Natural Products (LUTEIN VISION BLEND) CAPS Take 2 capsules by mouth daily preservision brand      Omega-3 Fatty Acids (FISH OIL PO)       omeprazole (PRILOSEC) 20 MG capsule       psyllium (METAMUCIL) WAFR Take 2 Wafers by mouth daily      warfarin (COUMADIN) 2.5 MG tablet Take 2.5 mg by mouth daily DAILY FOR NOW         Allergies   Allergen Reactions    Penicillins        Family History   Problem Relation Age of Onset    Cerebrovascular Disease Mother     Heart Disease Father        Additional medical/Social/Surgical histories reviewed in [facility] and updated as appropriate.        PHYSICAL EXAM  General  - normal appearance, in no obvious distress  Musculoskeletal - right knee  - stance: mildly antalgic gait  - inspection: trace soft tissue swelling at medial compartment  - palpation: No significant joint line tenderness  - ROM: 120 degrees flexion, 0 degrees extension  - strength: 5/5 in flexion, 5/5 in extension  - special tests:  (-) Carla  (-) varus at 0 and 30 degrees flexion  (-) valgus at 0 and 30 degrees flexion  Neuro  - no sensory or motor deficit, grossly normal coordination, normal muscle tone              ASSESSMENT & PLAN  Mr. Ruiz is a 89 year old male who presents to clinic today with right knee pain.    I ordered and independently reviewed an xray of his right knee, this reveals minimal osteoarthritis.    His symptoms very well may be secondary to a muscular strain or intraarticular soft tissue injury.  Fortunately his symptoms are resolving.    We did discuss diclofenac gel and offloading, both of which I think will help him quite a bit.  I also encouraged Sg to be active as able, which he is doing.    If his symptoms are not improving whatsoever in the coming weeks we could consider an intra-articular knee injection.  If he does well we can follow-up as need be for this and other issues.    It was a pleasure  seeing Raj today.    Robert Jim DO, [facility]  Primary Care Sports Medicine      This note was constructed using Dragon dictation software, please excuse any minor errors in spelling, grammar, or syntax.

## 2025-06-15 ENCOUNTER — HEALTH MAINTENANCE LETTER (OUTPATIENT)
Age: OVER 89
End: 2025-06-15